# Patient Record
Sex: MALE | Race: BLACK OR AFRICAN AMERICAN
[De-identification: names, ages, dates, MRNs, and addresses within clinical notes are randomized per-mention and may not be internally consistent; named-entity substitution may affect disease eponyms.]

---

## 2022-10-31 ENCOUNTER — HOSPITAL ENCOUNTER (INPATIENT)
Dept: HOSPITAL 54 - ER | Age: 72
LOS: 9 days | Discharge: NURSING, REHAB OR LONG-TERM CARE | DRG: 871 | End: 2022-11-09
Attending: NURSE PRACTITIONER | Admitting: NURSE PRACTITIONER
Payer: MEDICARE

## 2022-10-31 VITALS — DIASTOLIC BLOOD PRESSURE: 49 MMHG | SYSTOLIC BLOOD PRESSURE: 77 MMHG

## 2022-10-31 VITALS — HEIGHT: 65 IN | BODY MASS INDEX: 29.49 KG/M2 | WEIGHT: 177 LBS

## 2022-10-31 VITALS — DIASTOLIC BLOOD PRESSURE: 55 MMHG | SYSTOLIC BLOOD PRESSURE: 101 MMHG

## 2022-10-31 DIAGNOSIS — E83.52: ICD-10-CM

## 2022-10-31 DIAGNOSIS — I10: ICD-10-CM

## 2022-10-31 DIAGNOSIS — Z79.899: ICD-10-CM

## 2022-10-31 DIAGNOSIS — E44.0: ICD-10-CM

## 2022-10-31 DIAGNOSIS — F02.80: ICD-10-CM

## 2022-10-31 DIAGNOSIS — G93.41: ICD-10-CM

## 2022-10-31 DIAGNOSIS — M81.0: ICD-10-CM

## 2022-10-31 DIAGNOSIS — Z96.641: ICD-10-CM

## 2022-10-31 DIAGNOSIS — D68.9: ICD-10-CM

## 2022-10-31 DIAGNOSIS — K73.9: ICD-10-CM

## 2022-10-31 DIAGNOSIS — F20.9: ICD-10-CM

## 2022-10-31 DIAGNOSIS — Z91.81: ICD-10-CM

## 2022-10-31 DIAGNOSIS — D64.9: ICD-10-CM

## 2022-10-31 DIAGNOSIS — F29: ICD-10-CM

## 2022-10-31 DIAGNOSIS — C34.90: ICD-10-CM

## 2022-10-31 DIAGNOSIS — K43.9: ICD-10-CM

## 2022-10-31 DIAGNOSIS — Z20.822: ICD-10-CM

## 2022-10-31 DIAGNOSIS — G20: ICD-10-CM

## 2022-10-31 DIAGNOSIS — E87.20: ICD-10-CM

## 2022-10-31 DIAGNOSIS — E86.1: ICD-10-CM

## 2022-10-31 DIAGNOSIS — J18.0: ICD-10-CM

## 2022-10-31 DIAGNOSIS — I48.91: ICD-10-CM

## 2022-10-31 DIAGNOSIS — A41.9: Primary | ICD-10-CM

## 2022-10-31 LAB
ALBUMIN SERPL BCP-MCNC: 2.1 G/DL (ref 3.4–5)
ALP SERPL-CCNC: 84 U/L (ref 46–116)
ALT SERPL W P-5'-P-CCNC: 15 U/L (ref 12–78)
AST SERPL W P-5'-P-CCNC: 28 U/L (ref 15–37)
BASOPHILS # BLD AUTO: 0 K/UL (ref 0–0.2)
BASOPHILS NFR BLD AUTO: 0.3 % (ref 0–2)
BILIRUB DIRECT SERPL-MCNC: 0.3 MG/DL (ref 0–0.2)
BILIRUB SERPL-MCNC: 0.6 MG/DL (ref 0.2–1)
BUN SERPL-MCNC: 22 MG/DL (ref 7–18)
CALCIUM SERPL-MCNC: 10.5 MG/DL (ref 8.5–10.1)
CHLORIDE SERPL-SCNC: 102 MMOL/L (ref 98–107)
CO2 SERPL-SCNC: 30 MMOL/L (ref 21–32)
CREAT SERPL-MCNC: 1.2 MG/DL (ref 0.6–1.3)
EOSINOPHIL NFR BLD AUTO: 0.2 % (ref 0–6)
GLUCOSE SERPL-MCNC: 164 MG/DL (ref 74–106)
HCT VFR BLD AUTO: 41 % (ref 39–51)
HGB BLD-MCNC: 13.7 G/DL (ref 13.5–17.5)
LYMPHOCYTES NFR BLD AUTO: 0.7 K/UL (ref 0.8–4.8)
LYMPHOCYTES NFR BLD AUTO: 6.7 % (ref 20–44)
MCHC RBC AUTO-ENTMCNC: 33 G/DL (ref 31–36)
MCV RBC AUTO: 88 FL (ref 80–96)
MONOCYTES NFR BLD AUTO: 1.1 K/UL (ref 0.1–1.3)
MONOCYTES NFR BLD AUTO: 10.3 % (ref 2–12)
NEUTROPHILS # BLD AUTO: 8.7 K/UL (ref 1.8–8.9)
NEUTROPHILS NFR BLD AUTO: 82.5 % (ref 43–81)
PLATELET # BLD AUTO: 209 K/UL (ref 150–450)
POTASSIUM SERPL-SCNC: 4.2 MMOL/L (ref 3.5–5.1)
PROT SERPL-MCNC: 9.1 G/DL (ref 6.4–8.2)
RBC # BLD AUTO: 4.68 MIL/UL (ref 4.5–6)
SODIUM SERPL-SCNC: 140 MMOL/L (ref 136–145)
WBC NRBC COR # BLD AUTO: 10.5 K/UL (ref 4.3–11)

## 2022-10-31 PROCEDURE — G0378 HOSPITAL OBSERVATION PER HR: HCPCS

## 2022-10-31 PROCEDURE — C9803 HOPD COVID-19 SPEC COLLECT: HCPCS

## 2022-10-31 PROCEDURE — A4349 DISPOSABLE MALE EXTERNAL CAT: HCPCS

## 2022-10-31 PROCEDURE — A9575 INJ GADOTERATE MEGLUMI 0.1ML: HCPCS

## 2022-10-31 RX ADMIN — CALCIUM CARBONATE-CHOLECALCIFEROL TAB 250 MG-125 UNIT SCH UDTAB: 250-125 TAB at 17:43

## 2022-10-31 RX ADMIN — Medication SCH MG: at 21:12

## 2022-10-31 RX ADMIN — PIPERACILLIN SODIUM AND TAZOBACTAM SODIUM SCH MLS/HR: .375; 3 INJECTION, POWDER, LYOPHILIZED, FOR SOLUTION INTRAVENOUS at 23:19

## 2022-10-31 RX ADMIN — PIPERACILLIN SODIUM AND TAZOBACTAM SODIUM SCH MLS/HR: .375; 3 INJECTION, POWDER, LYOPHILIZED, FOR SOLUTION INTRAVENOUS at 17:55

## 2022-10-31 RX ADMIN — SODIUM CHLORIDE PRN MLS/HR: 9 INJECTION, SOLUTION INTRAVENOUS at 17:56

## 2022-10-31 RX ADMIN — Medication SCH MG: at 17:43

## 2022-10-31 RX ADMIN — BENZTROPINE MESYLATE SCH MG: 1 TABLET ORAL at 17:43

## 2022-10-31 RX ADMIN — LORAZEPAM SCH MG: 1 TABLET ORAL at 21:12

## 2022-10-31 RX ADMIN — DIVALPROEX SODIUM SCH MG: 250 TABLET, DELAYED RELEASE ORAL at 17:43

## 2022-10-31 NOTE — NUR
RN ADMITTING NOTES



PATIENT ARRIVED TO UNIT VIA GURNEY @1700, ACCOMPANIED BY TWO ER STAFF. PATIENT ON 3L OF O2 
VIA NC. NO S/S OF RESPIRATORY DISTRESS. PATIENT HAS THREE IV ACCESS: R UA #18 RUNNING NS 
@125 ML/HR, R FA #20 SL, AND L AC #18 SL. INTACT AND PATENT. PATIENT IS A/Ox1-2, ABLE TO 
MAKE NEEDS KNOWN. PATIENT IS INCONTINENT, USES DIAPER. ORIENTED TO ROOM, CALL LIGHT, AND 
STAFF. TELE MONITORING ATTACHED: SHOWING SR HR 75. FULL BODY ASSESSMENT COMPLETED: CARDIAC 
WNL, RESPIRATORY L LOWER LOBE DIMINISHED, GI/ WNL, SKIN: SACRAL SCARING, ABDOMINAL 
SCARRING, L LEG SCARRING. SAFETY MEASURES IN PLACE: CALL LIGHT WITHIN REACH, HOB ELEVATED, 
SIDE RAILS UPx 2, BED LOCKED AND IN LOWEST POSITION, BED ALARM ON. WILL CONTINUE TO MONITOR.

## 2022-10-31 NOTE — NUR
BIB  FROM CARE FACILITY DUE TO FEVER AND O2SAT IN THE LOW 80'S ON ROOM 
AIR TACHY A/O X2 ABLE TO MAKE NEEDS KNOWN. IV LAC PRESENT ON ARRIVAL

## 2022-11-01 VITALS — DIASTOLIC BLOOD PRESSURE: 82 MMHG | SYSTOLIC BLOOD PRESSURE: 143 MMHG

## 2022-11-01 VITALS — DIASTOLIC BLOOD PRESSURE: 54 MMHG | SYSTOLIC BLOOD PRESSURE: 94 MMHG

## 2022-11-01 VITALS — SYSTOLIC BLOOD PRESSURE: 99 MMHG | DIASTOLIC BLOOD PRESSURE: 73 MMHG

## 2022-11-01 LAB
BASOPHILS # BLD AUTO: 0 K/UL (ref 0–0.2)
BASOPHILS NFR BLD AUTO: 0.3 % (ref 0–2)
BUN SERPL-MCNC: 18 MG/DL (ref 7–18)
CALCIUM SERPL-MCNC: 9 MG/DL (ref 8.5–10.1)
CHLORIDE SERPL-SCNC: 105 MMOL/L (ref 98–107)
CO2 SERPL-SCNC: 27 MMOL/L (ref 21–32)
CREAT SERPL-MCNC: 0.8 MG/DL (ref 0.6–1.3)
D DIMER PPP FEU-MCNC: 8.63 MG/L(FEU (ref 0.17–0.5)
EOSINOPHIL NFR BLD AUTO: 3.1 % (ref 0–6)
FERRITIN SERPL-MCNC: 588 NG/ML (ref 8–388)
GLUCOSE SERPL-MCNC: 100 MG/DL (ref 74–106)
HCT VFR BLD AUTO: 35 % (ref 39–51)
HGB BLD-MCNC: 11.4 G/DL (ref 13.5–17.5)
IRON SERPL-MCNC: 50 UG/DL (ref 50–175)
LYMPHOCYTES NFR BLD AUTO: 1.6 K/UL (ref 0.8–4.8)
LYMPHOCYTES NFR BLD AUTO: 22.3 % (ref 20–44)
MAGNESIUM SERPL-MCNC: 2 MG/DL (ref 1.8–2.4)
MCHC RBC AUTO-ENTMCNC: 32 G/DL (ref 31–36)
MCV RBC AUTO: 88 FL (ref 80–96)
MONOCYTES NFR BLD AUTO: 1.1 K/UL (ref 0.1–1.3)
MONOCYTES NFR BLD AUTO: 15 % (ref 2–12)
NEUTROPHILS # BLD AUTO: 4.3 K/UL (ref 1.8–8.9)
NEUTROPHILS NFR BLD AUTO: 59.3 % (ref 43–81)
PHOSPHATE SERPL-MCNC: 3.5 MG/DL (ref 2.5–4.9)
PLATELET # BLD AUTO: 155 K/UL (ref 150–450)
POTASSIUM SERPL-SCNC: 3.8 MMOL/L (ref 3.5–5.1)
RBC # BLD AUTO: 3.99 MIL/UL (ref 4.5–6)
SODIUM SERPL-SCNC: 140 MMOL/L (ref 136–145)
TIBC SERPL-MCNC: 202 UG/DL (ref 250–450)
TSH SERPL DL<=0.005 MIU/L-ACNC: 2.43 UIU/ML (ref 0.36–3.74)
WBC NRBC COR # BLD AUTO: 7.3 K/UL (ref 4.3–11)

## 2022-11-01 RX ADMIN — BENZTROPINE MESYLATE SCH MG: 1 TABLET ORAL at 08:33

## 2022-11-01 RX ADMIN — PIPERACILLIN SODIUM AND TAZOBACTAM SODIUM SCH MLS/HR: .375; 3 INJECTION, POWDER, LYOPHILIZED, FOR SOLUTION INTRAVENOUS at 12:46

## 2022-11-01 RX ADMIN — Medication SCH MG: at 08:34

## 2022-11-01 RX ADMIN — ACETAMINOPHEN PRN MG: 325 TABLET ORAL at 17:02

## 2022-11-01 RX ADMIN — CALCIUM CARBONATE-CHOLECALCIFEROL TAB 250 MG-125 UNIT SCH UDTAB: 250-125 TAB at 08:34

## 2022-11-01 RX ADMIN — SODIUM CHLORIDE PRN MLS/HR: 9 INJECTION, SOLUTION INTRAVENOUS at 17:10

## 2022-11-01 RX ADMIN — LORAZEPAM SCH MG: 1 TABLET ORAL at 08:33

## 2022-11-01 RX ADMIN — Medication SCH MG: at 17:33

## 2022-11-01 RX ADMIN — PIPERACILLIN SODIUM AND TAZOBACTAM SODIUM SCH MLS/HR: .375; 3 INJECTION, POWDER, LYOPHILIZED, FOR SOLUTION INTRAVENOUS at 05:01

## 2022-11-01 RX ADMIN — SODIUM CHLORIDE PRN MLS/HR: 9 INJECTION, SOLUTION INTRAVENOUS at 03:38

## 2022-11-01 RX ADMIN — LORAZEPAM SCH MG: 1 TABLET ORAL at 21:54

## 2022-11-01 RX ADMIN — SODIUM CHLORIDE PRN MLS/HR: 9 INJECTION, SOLUTION INTRAVENOUS at 12:46

## 2022-11-01 RX ADMIN — DIVALPROEX SODIUM SCH MG: 250 TABLET, DELAYED RELEASE ORAL at 08:33

## 2022-11-01 RX ADMIN — DIVALPROEX SODIUM SCH MG: 250 TABLET, DELAYED RELEASE ORAL at 17:33

## 2022-11-01 RX ADMIN — DEXTROSE MONOHYDRATE SCH MLS/HR: 50 INJECTION, SOLUTION INTRAVENOUS at 14:17

## 2022-11-01 RX ADMIN — Medication SCH MG: at 21:53

## 2022-11-01 RX ADMIN — PIPERACILLIN SODIUM AND TAZOBACTAM SODIUM SCH MLS/HR: .375; 3 INJECTION, POWDER, LYOPHILIZED, FOR SOLUTION INTRAVENOUS at 18:19

## 2022-11-01 RX ADMIN — BENZTROPINE MESYLATE SCH MG: 1 TABLET ORAL at 17:34

## 2022-11-01 RX ADMIN — DEXTROSE MONOHYDRATE SCH MLS/HR: 50 INJECTION, SOLUTION INTRAVENOUS at 01:45

## 2022-11-01 NOTE — NUR
sylvia rn note 

 received patient from  from med/surg  with dx sepsis and a flutter on tele monitor patient 
alert with confusion,unable to answer any questions,  on 3 l nac of o2 saturation  97%, 
placed on tele monitor st \aflutter hr 145 , at this time,  lt ac hl and rt upper arm and rt 
fa hl intact and flushed well , t [id number] at this time i, Tylenol po given coolimg measure 
provided , bed in lowest and locked position. call light within reach with safety measures 
implemented. All needs were attended for the moment., on ivf as ordered , will monitor 



Contacted pharmacy for the antibiotic

## 2022-11-01 NOTE — NUR
RN NOTES



DR MACKENZIE HAS BEEN MADE AWARE THAT THE PT REGISTERED WITH HR OF OVER 130S, MD ORDERED EKG 
STAT- RESULTED AS ATRIAL FLUTTER, ORDERED AMIODARONE BOLUS AND DRIP, [facility] HAS BEEN NOTIFIED 
FOR A TRANSFER. RECOMMENDED CARDIO CONSULT -DR ONTIVEROS MADE AWARE.

## 2022-11-01 NOTE — NUR
RN NOTES



TRANSFERRED PATIENT TO [facility] AND WAS RECEIVED BY KELSIE RODRIGUES. PATIENT WAS SAFELY TRANSFERRED VIA 
HIS OWN BED, ON 3LPM VIA NC, TELEMONITORING REPORTS SR 130S BPM, EKG SHOWS ATRIAL FLUTTER. 
CALLED THE PHARMACY TO SEND AMIODARONE TO THE [facility]. ALL BELONGINGS CHART, AND MEDICATIONS 
ENDORSED. MD AND CHARGE NURSE AWARE OF THE TRANSFER.

## 2022-11-01 NOTE — NUR
TELE RN CLOSING NOTES



PATIENT LYING IN BED, AWAKE AOx2, ABLE TO MAKE NEEDS KNOWN. ON NC 3LPM AND TOLERATING WELL. 
NO SOB NOTED. NO S/SX OF RESPIRATORY DISTRESS NOTED. TELE MONITOR SHOWING SINUS GROVER/SINUS 
RHYTHM HR 55. IV ACCESS IN L AC#18 SL, R FA#20 SL, R UA#18 RUNNING NS @125 ML/HR. IV IS 
INTACT, PATENT, AND FLUSHING WELL. SAFETY PRECAUTIONS IN PLACE: BED IN LOWEST, LOCKED 
POSITION, SIDE RAILS UPx2, AND BRAKES ON. TABLE AND CALL LIGHT WITHIN REACH. WILL ENDORSE TO 
NEXT SHIFT ANY CUCO.

## 2022-11-01 NOTE — NUR
TELE RN OPENING NOTES



RECEIVED PT LYING IN BED, AWAKE AOx1-2, EPISODES OF CONFUSION,  ABLE TO MAKE NEEDS KNOWN. ON 
NC 3LPM AND TOLERATING WELL AT 97%. NO SOB NOTED, BREATHING EVEN AND UNLABORED. NO S/SX OF 
RESPIRATORY DISTRESS NOTED. TELE MONITOR SHOWING SINUS GROVER/SINUS RHYTHM AT 50 BPM. IV 
ACCESSES NOTED IN LAC G#18 (SL), RFA G#20 (SL), JENNY G#18 RUNNING NS @125 ML/HR. ALL IV 
ACCESSES INTACT, PATENT, AND FLUSHING WELL. SAFETY PRECAUTIONS IN PLACE: BED IN LOWEST, 
LOCKED POSITION, SIDE RAILS UPx3, AND BRAKES ON, TRAY TABLE AND CALL LIGHT WITHIN REACH. 
WILL CONTINUE TO MONITOR DURING MY SHIFT.

## 2022-11-01 NOTE — NUR
RN NOTES:



PT IS ON AMIO DRIP. HR CONVERTED TO SINUS RHYTHM TO SINUS GROVER 58 TO 59. NOTIFIED DR. PERES. ORDER TO STOP THE AMIO DRIP. NOTED AND CARRIED OUT.

## 2022-11-01 NOTE — NUR
RN NOTES - LAB RESULTS



LAB CALLED TO REPORT GRAM POSITIVE COCCI IN CHAINS SEEN ON GRAM STAIN AEROBIC - MD MADE 
AWARE.

## 2022-11-01 NOTE — NUR
[facility] RN NOTE 

 VENOUS DOPPLEX DONE NEGATIVE FOR DVT PER TECH , AND LEFT A MESSAGE TO JONE  FAMILY MEMBER TO 
GET CONSENT FOR MRI AND CT SCAN  WILL ENDORSE TO RN  NEXT SHIFT DEVIN

## 2022-11-02 VITALS — SYSTOLIC BLOOD PRESSURE: 119 MMHG | DIASTOLIC BLOOD PRESSURE: 85 MMHG

## 2022-11-02 VITALS — SYSTOLIC BLOOD PRESSURE: 99 MMHG | DIASTOLIC BLOOD PRESSURE: 73 MMHG

## 2022-11-02 VITALS — DIASTOLIC BLOOD PRESSURE: 64 MMHG | SYSTOLIC BLOOD PRESSURE: 110 MMHG

## 2022-11-02 VITALS — SYSTOLIC BLOOD PRESSURE: 110 MMHG | DIASTOLIC BLOOD PRESSURE: 58 MMHG

## 2022-11-02 VITALS — DIASTOLIC BLOOD PRESSURE: 63 MMHG | SYSTOLIC BLOOD PRESSURE: 90 MMHG

## 2022-11-02 VITALS — DIASTOLIC BLOOD PRESSURE: 61 MMHG | SYSTOLIC BLOOD PRESSURE: 97 MMHG

## 2022-11-02 LAB
ALBUMIN SERPL BCP-MCNC: 1.6 G/DL (ref 3.4–5)
ALBUMIN/GLOB SERPL: 0.4 {RATIO} (ref 0.7–1.7)
ALP SERPL-CCNC: 64 U/L (ref 46–116)
ALPHA1 GLOB SERPL ELPH-MCNC: 0.4 G/DL (ref 0–0.4)
ALPHA2 GLOB SERPL ELPH-MCNC: 0.9 G/DL (ref 0.4–1)
ALT SERPL W P-5'-P-CCNC: 8 U/L (ref 12–78)
AST SERPL W P-5'-P-CCNC: 21 U/L (ref 15–37)
B-GLOBULIN SERPL ELPH-MCNC: 1.1 G/DL (ref 0.7–1.3)
BILIRUB SERPL-MCNC: 0.5 MG/DL (ref 0.2–1)
BUN SERPL-MCNC: 12 MG/DL (ref 7–18)
CALCIUM SERPL-MCNC: 8.9 MG/DL (ref 8.5–10.1)
CHLORIDE SERPL-SCNC: 106 MMOL/L (ref 98–107)
CO2 SERPL-SCNC: 30 MMOL/L (ref 21–32)
CREAT SERPL-MCNC: 0.9 MG/DL (ref 0.6–1.3)
GLUCOSE SERPL-MCNC: 104 MG/DL (ref 74–106)
IGA SERPL-MCNC: 350 MG/DL (ref 61–437)
IGM SERPL-MCNC: 233 MG/DL (ref 15–143)
POTASSIUM SERPL-SCNC: 3.8 MMOL/L (ref 3.5–5.1)
PROT SERPL-MCNC: 7 G/DL (ref 6.4–8.2)
SODIUM SERPL-SCNC: 137 MMOL/L (ref 136–145)

## 2022-11-02 RX ADMIN — LORAZEPAM SCH MG: 1 TABLET ORAL at 08:45

## 2022-11-02 RX ADMIN — DIVALPROEX SODIUM SCH MG: 250 TABLET, DELAYED RELEASE ORAL at 08:46

## 2022-11-02 RX ADMIN — BENZTROPINE MESYLATE SCH MG: 1 TABLET ORAL at 17:11

## 2022-11-02 RX ADMIN — Medication SCH MG: at 17:11

## 2022-11-02 RX ADMIN — BENZTROPINE MESYLATE SCH MG: 1 TABLET ORAL at 08:45

## 2022-11-02 RX ADMIN — DIVALPROEX SODIUM SCH MG: 250 TABLET, DELAYED RELEASE ORAL at 17:11

## 2022-11-02 RX ADMIN — SODIUM CHLORIDE PRN MLS/HR: 9 INJECTION, SOLUTION INTRAVENOUS at 03:42

## 2022-11-02 RX ADMIN — PIPERACILLIN SODIUM AND TAZOBACTAM SODIUM SCH MLS/HR: .375; 3 INJECTION, POWDER, LYOPHILIZED, FOR SOLUTION INTRAVENOUS at 00:19

## 2022-11-02 RX ADMIN — PIPERACILLIN SODIUM AND TAZOBACTAM SODIUM SCH MLS/HR: .375; 3 INJECTION, POWDER, LYOPHILIZED, FOR SOLUTION INTRAVENOUS at 17:35

## 2022-11-02 RX ADMIN — DEXTROSE MONOHYDRATE SCH MLS/HR: 50 INJECTION, SOLUTION INTRAVENOUS at 01:06

## 2022-11-02 RX ADMIN — PIPERACILLIN SODIUM AND TAZOBACTAM SODIUM SCH MLS/HR: .375; 3 INJECTION, POWDER, LYOPHILIZED, FOR SOLUTION INTRAVENOUS at 05:10

## 2022-11-02 RX ADMIN — Medication SCH MG: at 08:45

## 2022-11-02 RX ADMIN — PIPERACILLIN SODIUM AND TAZOBACTAM SODIUM SCH MLS/HR: .375; 3 INJECTION, POWDER, LYOPHILIZED, FOR SOLUTION INTRAVENOUS at 11:34

## 2022-11-02 RX ADMIN — Medication SCH MG: at 22:55

## 2022-11-02 RX ADMIN — LORAZEPAM SCH MG: 1 TABLET ORAL at 22:54

## 2022-11-02 RX ADMIN — DEXTROSE MONOHYDRATE SCH MLS/HR: 50 INJECTION, SOLUTION INTRAVENOUS at 15:03

## 2022-11-02 NOTE — NUR
RN CLOSING NOTES:



PT IN BED, SLEEPING BUT EASILY AROUSABLE, ALERT/ORIENTED X2 AND VERBALLY RESPONSIVE WITH 
CONFUSION. ON O2 AT 3L/MIN VIA N/C AND PT TOLERATED WELL. O2 SAT 99%. IV ACCESSES ON LAC#18 
G, RFA#20G, JENNY#18G INTACT AND PATENT. NO S/S OF INFILTRATIONS. RUNNING NS AT 125CC/HR. NO 
C/O PAIN OR DISCOMFORT. NO ACUTE DISTRESS. PT REMAIN NPO AFTER MIDNIGHT FOR MRI AND CT SCAN 
W/ CONTRAST. ALL DUE MEDS GIVEN AS ORDERED. SAFETY MEASURES IN PLACE. SIDE RAILS UP X3, BED 
IN LOWEST POSITION AND LOCKED. PLACE CALL LIGHT WITH IN REACH. WILL ENDORSE TO MORNING SHIFT 
NURSE.

## 2022-11-02 NOTE — NUR
RN NOTES:

 

CALLED DAUGHTER JONE TO OBTAIN  CONSENT FOR MRI AND CT SCAN. DIDN'T ANSWER. LEFT MESSAGE.

## 2022-11-02 NOTE — NUR
RN OPEN NOTES:



PT IN BED, SLEEPING , ALERT/ORIENTED X2 AND VERBALLY RESPONSIVE OCCASIONALLY  CONFUSION. ON 
O2 AT 3L/MIN VIA N/C AND PT TOLERATED WELL. O2 SAT 99%. IV ACCESSES ON LAC#18 G, RFA#20G, 
JENNY#18G INTACT AND PATENT. NO S/S OF INFILTRATIONS. RUNNING NS AT 125CC/HR. NO C/O PAIN OR 
DISCOMFORT. NO ACUTE DISTRESS.  SAFETY MEASURES IN PLACE. SIDE RAILS UP X3, BED IN LOWEST 
POSITION AND LOCKED. PLACE CALL LIGHT WITH IN REACH. WILL CONTINUE TO MONITOR  AND FALLOW 
POC

## 2022-11-02 NOTE — NUR
RN CLOSING NOTES:

PT IN BED,  ALERT/ORIENTED X2 AND VERBALLY RESPONSIVE WITH CONFUSION. ON O2 AT 5L/MIN VIA 
N/C AND PT TOLERATED WELL. O2 SAT 99%. IV ACCESSES ON LAC#18 G, RFA#20G, JENNY#18G INTACT AND 
PATENT. NO S/S OF INFILTRATIONS.  NO C/O PAIN OR DISCOMFORT. NO ACUTE DISTRESS. PT WILL BE 
NPO AFTER MIDNIGHT FOR MRI W/ CONTRAST. ALL DUE MEDS GIVEN AS ORDERED. SAFETY MEASURES IN 
PLACE. SIDE RAILS UP X3, BED IN LOWEST POSITION AND LOCKED. PLACE CALL LIGHT WITH IN REACH. 
WILL ENDORSE TO MORNING SHIFT NURSE.

## 2022-11-03 VITALS — SYSTOLIC BLOOD PRESSURE: 91 MMHG | DIASTOLIC BLOOD PRESSURE: 56 MMHG

## 2022-11-03 VITALS — DIASTOLIC BLOOD PRESSURE: 67 MMHG | SYSTOLIC BLOOD PRESSURE: 95 MMHG

## 2022-11-03 VITALS — DIASTOLIC BLOOD PRESSURE: 63 MMHG | SYSTOLIC BLOOD PRESSURE: 110 MMHG

## 2022-11-03 VITALS — SYSTOLIC BLOOD PRESSURE: 98 MMHG | DIASTOLIC BLOOD PRESSURE: 63 MMHG

## 2022-11-03 VITALS — SYSTOLIC BLOOD PRESSURE: 98 MMHG | DIASTOLIC BLOOD PRESSURE: 62 MMHG

## 2022-11-03 VITALS — DIASTOLIC BLOOD PRESSURE: 84 MMHG | SYSTOLIC BLOOD PRESSURE: 118 MMHG

## 2022-11-03 LAB
BUN SERPL-MCNC: 8 MG/DL (ref 7–18)
CALCIUM SERPL-MCNC: 9.1 MG/DL (ref 8.5–10.1)
CHLORIDE SERPL-SCNC: 104 MMOL/L (ref 98–107)
CO2 SERPL-SCNC: 29 MMOL/L (ref 21–32)
CREAT SERPL-MCNC: 0.9 MG/DL (ref 0.6–1.3)
GLUCOSE SERPL-MCNC: 128 MG/DL (ref 74–106)
POTASSIUM SERPL-SCNC: 3.6 MMOL/L (ref 3.5–5.1)
SODIUM SERPL-SCNC: 139 MMOL/L (ref 136–145)

## 2022-11-03 RX ADMIN — PIPERACILLIN SODIUM AND TAZOBACTAM SODIUM SCH MLS/HR: .375; 3 INJECTION, POWDER, LYOPHILIZED, FOR SOLUTION INTRAVENOUS at 12:13

## 2022-11-03 RX ADMIN — ACETAMINOPHEN PRN MG: 325 TABLET ORAL at 01:44

## 2022-11-03 RX ADMIN — DIVALPROEX SODIUM SCH MG: 250 TABLET, DELAYED RELEASE ORAL at 08:26

## 2022-11-03 RX ADMIN — Medication SCH MG: at 08:26

## 2022-11-03 RX ADMIN — PIPERACILLIN SODIUM AND TAZOBACTAM SODIUM SCH MLS/HR: .375; 3 INJECTION, POWDER, LYOPHILIZED, FOR SOLUTION INTRAVENOUS at 06:12

## 2022-11-03 RX ADMIN — BENZTROPINE MESYLATE SCH MG: 1 TABLET ORAL at 08:26

## 2022-11-03 RX ADMIN — DEXTROSE MONOHYDRATE SCH MLS/HR: 50 INJECTION, SOLUTION INTRAVENOUS at 04:31

## 2022-11-03 RX ADMIN — DEXTROSE MONOHYDRATE SCH MLS/HR: 50 INJECTION, SOLUTION INTRAVENOUS at 21:17

## 2022-11-03 RX ADMIN — PIPERACILLIN SODIUM AND TAZOBACTAM SODIUM SCH MLS/HR: .375; 3 INJECTION, POWDER, LYOPHILIZED, FOR SOLUTION INTRAVENOUS at 18:41

## 2022-11-03 RX ADMIN — Medication SCH MG: at 21:16

## 2022-11-03 RX ADMIN — LORAZEPAM SCH MG: 1 TABLET ORAL at 08:26

## 2022-11-03 RX ADMIN — PIPERACILLIN SODIUM AND TAZOBACTAM SODIUM SCH MLS/HR: .375; 3 INJECTION, POWDER, LYOPHILIZED, FOR SOLUTION INTRAVENOUS at 01:45

## 2022-11-03 RX ADMIN — LORAZEPAM SCH MG: 1 TABLET ORAL at 21:16

## 2022-11-03 RX ADMIN — Medication SCH MG: at 16:14

## 2022-11-03 RX ADMIN — DIVALPROEX SODIUM SCH MG: 125 CAPSULE ORAL at 16:13

## 2022-11-03 RX ADMIN — BENZTROPINE MESYLATE SCH MG: 1 TABLET ORAL at 16:13

## 2022-11-03 NOTE — NUR
TELE RN CLOSING NOTE

PT REMAINS IN BED, AWAKE, A&O X1, CALM, COOPERATIVE. PT ON 4L NC WITH O2SAT RANGING FROM 
94%-100%; NO S/S OF RESP DISTRESS, NO SOB OR COUGH, NON-LABORED AND EQUAL BREATHING. PT 
ATTACHED TO EXTERNAL MONITOR SB-SR WITH HR RANGING FROM 57-76. IV ACCESS ,ON LAC 18G, RFA 
20G, AND JENNY 18G; INTACT AND PATENT; NS TKO A 10 ML/HR. ALL DUE MEDS ADMINISTERED DURING THE 
NIGHT. BED IN LOWEST POSITION, CALL LIGHT WITHIN REACH, SIDE RAILS UP X3. WILL ENDORSE TO 
DAYSHIFT NURSE TO CONTINUE CARE.

## 2022-11-03 NOTE — NUR
RN NOTE



SPOKE WITH MRI STAFF, PT DOES NOT NEED TO BE NPO FOR PROCEDURE. PMD MADE AWARE, T.O RESUME 
DIET.

## 2022-11-03 NOTE — NUR
RN NOTE



PT RECEIVED ASLEEP IN BED, RESPONSIVE TO STIMULI. RECEIVING O2 @ 4L VIA NASAL CANULA. PT IN 
NPO EXCEPT MEDS POST MIDNIGHT FOR CT BIOPSY NEEDLE IN AM. WITH IV ACCESS ON RAC G20 IN PLACE 
AND PATENT,DUE MEDICATIONS GIVEN. AM/PM CARE RENDERED. SAFETY MEASURES FOLLOWED. WILL 
CONTINUE TO MONITOR.

## 2022-11-03 NOTE — NUR
RN NOTE



PT RECEIVED ASLEEP IN BED, RESPONSIVE TO STIMULI. RECEIVING O2 @ 4L VIA NASAL CANULA. PT IN 
NPO EXCEPT MEDS. WITH IV ACCESS ON LAC G18, RFA G20, JENNY G18. SAFETY MEASURES FOLLOWED. WILL 
CONTINUE TO MONITOR.

## 2022-11-03 NOTE — NUR
Responsible Party: GRISELDA called [facility] [phone] where pt. resides and spoke to 
Darlene IGLESIAS regarding responsible party for pt., per Darlene the only point of contact/NEXT OF KIN 
they have is the pt.'s sister, Darlene Lofton [phone]. Per Darlene the pt. has always been 
alert & Oriented and made his own medical decisions. Per Darlene, the pt. has no children or 
spouse and they notified [facility] that pt. was transferred to [facility].  GRISELDA called Darlene x 2 separate 
times and left a voicemail. GRISELDA will continue efforts to contact [facility] and clarify 
relationship. GRIESLDA notified the pt.'s RNCullen.

## 2022-11-03 NOTE — NUR
PT SITTING IN BED, AWAKE EATING DINNER, ALERT/ORIENTED X2 AND VERBALLY RESPONSIVE 
OCCASIONALLY  CONFUSED. ON O2 AT 4L/MIN VIA N/C AND PT TOLERATING WELL. IV ACCESS ON RAC 
G#20. NO C/O PAIN OR DISCOMFORT. NOT IN ACUTE DISTRESS. SAFETY MEASURES IN PLACE. SIDE RAILS 
UP X3, BED LOCKED AND IN LOWEST POSITION. PLACED CALL LIGHT WITH IN REACH. BED ALARM ON, 
WILL CONTINUE TO MONITOR  AND FOLLOW PLAN OF CARE.

## 2022-11-04 VITALS — SYSTOLIC BLOOD PRESSURE: 95 MMHG | DIASTOLIC BLOOD PRESSURE: 56 MMHG

## 2022-11-04 VITALS — SYSTOLIC BLOOD PRESSURE: 124 MMHG | DIASTOLIC BLOOD PRESSURE: 57 MMHG

## 2022-11-04 VITALS — DIASTOLIC BLOOD PRESSURE: 62 MMHG | SYSTOLIC BLOOD PRESSURE: 90 MMHG

## 2022-11-04 VITALS — DIASTOLIC BLOOD PRESSURE: 60 MMHG | SYSTOLIC BLOOD PRESSURE: 94 MMHG

## 2022-11-04 VITALS — SYSTOLIC BLOOD PRESSURE: 92 MMHG | DIASTOLIC BLOOD PRESSURE: 67 MMHG

## 2022-11-04 VITALS — SYSTOLIC BLOOD PRESSURE: 99 MMHG | DIASTOLIC BLOOD PRESSURE: 74 MMHG

## 2022-11-04 LAB
BUN SERPL-MCNC: 9 MG/DL (ref 7–18)
CALCIUM SERPL-MCNC: 9.1 MG/DL (ref 8.5–10.1)
CHLORIDE SERPL-SCNC: 103 MMOL/L (ref 98–107)
CO2 SERPL-SCNC: 31 MMOL/L (ref 21–32)
CREAT SERPL-MCNC: 0.8 MG/DL (ref 0.6–1.3)
GLUCOSE SERPL-MCNC: 111 MG/DL (ref 74–106)
POTASSIUM SERPL-SCNC: 3.8 MMOL/L (ref 3.5–5.1)
SODIUM SERPL-SCNC: 137 MMOL/L (ref 136–145)

## 2022-11-04 RX ADMIN — DIVALPROEX SODIUM SCH MG: 125 CAPSULE ORAL at 10:44

## 2022-11-04 RX ADMIN — LORAZEPAM SCH MG: 1 TABLET ORAL at 21:32

## 2022-11-04 RX ADMIN — Medication SCH MG: at 17:36

## 2022-11-04 RX ADMIN — Medication SCH MG: at 10:45

## 2022-11-04 RX ADMIN — DEXTROSE MONOHYDRATE SCH MLS/HR: 50 INJECTION, SOLUTION INTRAVENOUS at 21:32

## 2022-11-04 RX ADMIN — Medication SCH MG: at 21:32

## 2022-11-04 RX ADMIN — DIVALPROEX SODIUM SCH MG: 125 CAPSULE ORAL at 17:36

## 2022-11-04 RX ADMIN — BENZTROPINE MESYLATE SCH MG: 1 TABLET ORAL at 17:36

## 2022-11-04 RX ADMIN — LORAZEPAM SCH MG: 1 TABLET ORAL at 10:45

## 2022-11-04 RX ADMIN — BENZTROPINE MESYLATE SCH MG: 1 TABLET ORAL at 10:44

## 2022-11-04 NOTE — NUR
PT ASLEEP IN BED, ALERT/ORIENTED X2 AND VERBALLY RESPONSIVE OCCASIONALLY  CONFUSED. ON O2 AT 
4L/MIN VIA N/C AND PT TOLERATING WELL. IV ACCESS ON RAC G#20. NO C/O PAIN OR DISCOMFORT. NOT 
IN ACUTE DISTRESS. SAFETY MEASURES IN PLACE. SIDE RAILS UP X3, BED LOCKED AND IN LOWEST 
POSITION. PLACED CALL LIGHT WITH IN REACH. BED ALARM ON, WILL ENDORSE TO NEXT NURSE ON DUTY 
FOR CONTINUITY OF CARE.

## 2022-11-04 NOTE — NUR
RN NOTES

 RECEIVED PATIENT IN THE BED A/O X2, TOTAL CARE, REFUSED PAIN. PATIENT ON O2-4LNC NO ACUTE 
RESPIRATORY DISTRESS. PATIENT NP AND SCHEDULED CT GUIDED LUNG BIOPSY TIODAY, ACLLED SISTER, 
AND UNABLE TO REACH, LEFT MASSAGE.

## 2022-11-04 NOTE — NUR
RN NOTES

 PM CARE DONE, PATIENT AWAKE, NO ACUTE RESPIRATORY DISTRESS, DUE MEDICATION ADMINISTERED, 
ASSIST TURN AND REPOSTION Q 2 HR. ENDORSED ONCOMING NURSE ABOUT CT GUIDED RASHEED BIOPSY TO 
FOLLOW UP, AND  GET CONSENT FORM SIGN VIA PATIENTS SISTER.

## 2022-11-04 NOTE — NUR
RN NOTES



RECEIVED PT FOR CONTINUITY OF CARE. PATIENT A/OX1-2 IN NO S/SX OF ACUTE DISTRESS AT THIS 
TIME; CURRENTLY ON 4L OF O2 VIA NC; WITH 02 SAT >95% AT THIS TIME.WITH IV ACCESS ON R AC#20. 
PATENT, INTACT AND FLUSHING WELL. . ON PUREED DIET. WILL ENSURE SAFETY MEASURES WITHIN THE 
SHIFT. PATIENT BED ALARM IS ON. HEAD OF BED ELEVATED. BED IS LOCKED,  IN LOWEST POSITION AND 
SIDE RAILS UP. CALL LIGHT WITHIN REACH OF THE PATIENT. WILL CONTINUE TO MONITOR AND REASSESS 
FOR ANY CHANGES AND WILL CARRY OUT ANY ONGOING AND ACTIVE MD ORDER.

## 2022-11-04 NOTE — NUR
PATHOLOGY WAS NOT AVAILABLE, BIOPSIES NEED TO BE DONE BEFORE 12P / PATHOLOGY. SO SPECIMEN 
DOESNT SIT OVER WEEKEND

## 2022-11-04 NOTE — NUR
RN NOTES'

 GET CALL FROM [facility], AND NOTIFIED TODAY UNABLE TO BE DONE  CT GUIDED 
BIOPSY,  BECAUSE NO PATHOLOGY ON  FRIDAY, WILL BE RESCHEDULED DURING A WEEK.  NOTIFIED Dr RICCI.

## 2022-11-05 VITALS — DIASTOLIC BLOOD PRESSURE: 55 MMHG | SYSTOLIC BLOOD PRESSURE: 91 MMHG

## 2022-11-05 VITALS — SYSTOLIC BLOOD PRESSURE: 99 MMHG | DIASTOLIC BLOOD PRESSURE: 76 MMHG

## 2022-11-05 VITALS — DIASTOLIC BLOOD PRESSURE: 56 MMHG | SYSTOLIC BLOOD PRESSURE: 112 MMHG

## 2022-11-05 VITALS — DIASTOLIC BLOOD PRESSURE: 45 MMHG | SYSTOLIC BLOOD PRESSURE: 88 MMHG

## 2022-11-05 VITALS — SYSTOLIC BLOOD PRESSURE: 103 MMHG | DIASTOLIC BLOOD PRESSURE: 66 MMHG

## 2022-11-05 VITALS — SYSTOLIC BLOOD PRESSURE: 94 MMHG | DIASTOLIC BLOOD PRESSURE: 59 MMHG

## 2022-11-05 LAB
BASOPHILS # BLD AUTO: 0 K/UL (ref 0–0.2)
BASOPHILS NFR BLD AUTO: 0.4 % (ref 0–2)
BUN SERPL-MCNC: 8 MG/DL (ref 7–18)
CALCIUM SERPL-MCNC: 9.9 MG/DL (ref 8.5–10.1)
CHLORIDE SERPL-SCNC: 103 MMOL/L (ref 98–107)
CO2 SERPL-SCNC: 31 MMOL/L (ref 21–32)
CREAT SERPL-MCNC: 0.9 MG/DL (ref 0.6–1.3)
EOSINOPHIL NFR BLD AUTO: 1.9 % (ref 0–6)
EOSINOPHIL NFR BLD MANUAL: 3 % (ref 0–4)
GLUCOSE SERPL-MCNC: 169 MG/DL (ref 74–106)
HCT VFR BLD AUTO: 35 % (ref 39–51)
HGB BLD-MCNC: 11.3 G/DL (ref 13.5–17.5)
LYMPHOCYTES NFR BLD AUTO: 2.5 K/UL (ref 0.8–4.8)
LYMPHOCYTES NFR BLD AUTO: 23.7 % (ref 20–44)
LYMPHOCYTES NFR BLD MANUAL: 30 % (ref 16–48)
MCHC RBC AUTO-ENTMCNC: 33 G/DL (ref 31–36)
MCV RBC AUTO: 88 FL (ref 80–96)
MONOCYTES NFR BLD AUTO: 1.8 K/UL (ref 0.1–1.3)
MONOCYTES NFR BLD AUTO: 17 % (ref 2–12)
MONOCYTES NFR BLD MANUAL: 9 % (ref 0–11)
NEUTROPHILS # BLD AUTO: 6.1 K/UL (ref 1.8–8.9)
NEUTROPHILS NFR BLD AUTO: 57 % (ref 43–81)
NEUTS BAND NFR BLD MANUAL: 3 % (ref 0–5)
NEUTS SEG NFR BLD MANUAL: 55 % (ref 42–76)
PLATELET # BLD AUTO: 179 K/UL (ref 150–450)
POTASSIUM SERPL-SCNC: 3.6 MMOL/L (ref 3.5–5.1)
RBC # BLD AUTO: 3.94 MIL/UL (ref 4.5–6)
SODIUM SERPL-SCNC: 136 MMOL/L (ref 136–145)
WBC NRBC COR # BLD AUTO: 10.6 K/UL (ref 4.3–11)

## 2022-11-05 RX ADMIN — LORAZEPAM SCH MG: 1 TABLET ORAL at 21:10

## 2022-11-05 RX ADMIN — BENZTROPINE MESYLATE SCH MG: 1 TABLET ORAL at 09:30

## 2022-11-05 RX ADMIN — Medication SCH MG: at 17:18

## 2022-11-05 RX ADMIN — Medication SCH MG: at 21:11

## 2022-11-05 RX ADMIN — BENZTROPINE MESYLATE SCH MG: 1 TABLET ORAL at 17:18

## 2022-11-05 RX ADMIN — Medication SCH MG: at 09:30

## 2022-11-05 RX ADMIN — LORAZEPAM SCH MG: 1 TABLET ORAL at 09:31

## 2022-11-05 RX ADMIN — DIVALPROEX SODIUM SCH MG: 125 CAPSULE ORAL at 09:31

## 2022-11-05 RX ADMIN — DIVALPROEX SODIUM SCH MG: 125 CAPSULE ORAL at 17:18

## 2022-11-05 RX ADMIN — DEXTROSE MONOHYDRATE SCH MLS/HR: 50 INJECTION, SOLUTION INTRAVENOUS at 21:10

## 2022-11-05 NOTE — NUR
RN NOTES



DR. LEBRON AWARE, PATIENT FOR CT GUIDED LUNG BIOPSY BUT CONSENTS ARE NOT SIGNED AND PER 
DAUGHTER JONE, REFUSE THE PROCEDURE. DR. LEBRON WILL CALL HER TODAY

## 2022-11-05 NOTE — NUR
TELE RN CLOSING NOTE



PT ASLEEP IN BED, ALERT/ORIENTED X1-2 AND VERBALLY RESPONSIVE OCCASIONALLY  CONFUSED. ON O2 
AT 4L/MIN VIA N/C AND PT TOLERATING WELL. IV ACCESS ON RAC G#20. NO C/O PAIN OR DISCOMFORT. 
NOT IN ACUTE DISTRESS. CONDOM CATH IN PLACE. 1100 ML OUTPUT. SAFETY MEASURES IN PLACE. SIDE 
RAILS UP X3, BED LOCKED AND IN LOWEST POSITION. PLACED CALL LIGHT WITH IN REACH. BED ALARM 
ON, ALL NEEDS MET AT HIS TIME. PM CARE AND PRESCRIBED WOUND TREATMENT DONE EARLIER. BED LOW 
LOCKED WILL ENDORSE TO NEXT NURSE ON DUTY FOR CONTINUITY OF CARE.

## 2022-11-05 NOTE — NUR
RN NOTES



RECEIVED PT FOR CONTINUITY OF CARE. PATIENT A/OX1-2, CONFUSED IN NO S/SX OF ACUTE DISTRESS 
AT THIS TIME; CURRENTLY ON 2L OF O2 VIA NC; WITH 02 SAT >95% AT THIS TIME.WITH IV ACCESS ON 
R AC#20. PATENT, INTACT AND FLUSHING WELL. . ON PUREED DIET. WILL ENSURE SAFETY MEASURES 
WITHIN THE SHIFT. PATIENT BED ALARM IS ON. HEAD OF BED ELEVATED. BED IS LOCKED,  IN LOWEST 
POSITION AND SIDE RAILS UP. CALL LIGHT WITHIN REACH OF THE PATIENT. WILL CONTINUE TO MONITOR 
AND REASSESS FOR ANY CHANGES AND WILL CARRY OUT ANY ONGOING AND ACTIVE MD ORDER.

## 2022-11-05 NOTE — NUR
RN OPENING NOTE



RCVD PT ASLEEP IN BED, ALERT/ORIENTED X1-2 AND VERBALLY RESPONSIVE OCCASIONALLY  CONFUSED. 
ON O2 AT 4L/MIN VIA N/C AND PT TOLERATING WELL. IV ACCESS ON RAC G#20. NO C/O PAIN OR 
DISCOMFORT. NOT IN ACUTE DISTRESS. SAFETY MEASURES IN PLACE. SIDE RAILS UP X3, BED LOCKED 
AND IN LOWEST POSITION. PLACED CALL LIGHT WITH IN REACH. BED ALARM ON, WILL ENDORSE TO NEXT 
NURSE ON DUTY FOR CONTINUITY OF CARE.

## 2022-11-05 NOTE — NUR
RN NOTES



CALLED PT'S SISTER (JONE ESCALONA @[phone]) TO SECURE CONSENT FOR CT guided needle biopsy, 
ROUTED TO , REQUESTED FOR CALLBACK. CHARGE RN MADE AWARE.

## 2022-11-05 NOTE — NUR
RN CLOSING NOTE: 



PATIENT REMAINS IN ROOM IN NO SIGNS OF RESPIRATORY DISTRESS, PATIENT NOW ON 2L OF O2 VIA NC 
;TOLERATING WELL SATURATING @ >95% SP02. SR ON MONITOR. SAFETY MEASURES IMPLEMENTED, BED IN 
LOWEST POSITION, LOCKED, SIDE RAILS UP, CALL LIGHT WITHIN REACH. ALL NEEDS AND ORDERS 
ADDRESSED DURING THE SHIFT. IV ACCESS MAINTAINED INTACT, SECURED AND FLUSHING WELL. ALL DUE 
MEDS GIVEN AS ORDERED & SCHEDULED ; PATIENT TOLERATED WELL. PATIENT KEPT CLEAN AND 
COMFORTABLE WITHIN THE SHIFT. STILL NEED TO OBTAINED CONSENT FOR CT GUIDED BIOPSY; WILL 
ENDORSE FOR FOLLOW THROUGH. PATIENT ENDORSED TO INCOMING SHIFT RN WITH STABLE VITAL SIGN AND 
FOR CONTINUITY OF CARE.

-------------------------------------------------------------------------------

Addendum: 11/05/22 at 0720 by JEB MAIER RN

-------------------------------------------------------------------------------

PT A/OX1-2; CONFUSED.

## 2022-11-05 NOTE — NUR
RN NOTES



PATIENT REMAINED TO BE IN NO SIGNS OF ACUTE RESPIRATORY DISTRESS ,SAFE ENVIRONMENT 
MAINTAINED FOR PT. AM PATIENT CARE ASSISTANCE RENDERED. WILL CONTINUE TO MONITOR AND 
REASSESS FOR ANY CHANGES THROUGHOUT THE SHIFT.

## 2022-11-06 VITALS — SYSTOLIC BLOOD PRESSURE: 99 MMHG | DIASTOLIC BLOOD PRESSURE: 57 MMHG

## 2022-11-06 VITALS — DIASTOLIC BLOOD PRESSURE: 67 MMHG | SYSTOLIC BLOOD PRESSURE: 102 MMHG

## 2022-11-06 VITALS — DIASTOLIC BLOOD PRESSURE: 63 MMHG | SYSTOLIC BLOOD PRESSURE: 126 MMHG

## 2022-11-06 VITALS — DIASTOLIC BLOOD PRESSURE: 58 MMHG | SYSTOLIC BLOOD PRESSURE: 101 MMHG

## 2022-11-06 VITALS — DIASTOLIC BLOOD PRESSURE: 78 MMHG | SYSTOLIC BLOOD PRESSURE: 112 MMHG

## 2022-11-06 RX ADMIN — BENZTROPINE MESYLATE SCH MG: 1 TABLET ORAL at 16:17

## 2022-11-06 RX ADMIN — LORAZEPAM SCH MG: 1 TABLET ORAL at 08:08

## 2022-11-06 RX ADMIN — LORAZEPAM SCH MG: 1 TABLET ORAL at 21:21

## 2022-11-06 RX ADMIN — DEXTROSE MONOHYDRATE SCH MLS/HR: 50 INJECTION, SOLUTION INTRAVENOUS at 21:24

## 2022-11-06 RX ADMIN — Medication SCH MG: at 21:22

## 2022-11-06 RX ADMIN — BENZTROPINE MESYLATE SCH MG: 1 TABLET ORAL at 08:06

## 2022-11-06 RX ADMIN — Medication SCH MG: at 08:05

## 2022-11-06 RX ADMIN — DIVALPROEX SODIUM SCH MG: 125 CAPSULE ORAL at 16:17

## 2022-11-06 RX ADMIN — DIVALPROEX SODIUM SCH MG: 125 CAPSULE ORAL at 08:06

## 2022-11-06 RX ADMIN — Medication SCH MG: at 16:17

## 2022-11-06 NOTE — NUR
RN NOTES



CALLED PT'S SISTER (JONE ESCALONA @[phone]) TO SECURE CONSENT FOR CT GUIDED NEEDLE BIOPSY 
ROUTED TO , REQUESTED FOR CALLBACK. CHARGE RN MADE AWARE.

## 2022-11-06 NOTE — NUR
TELE RN CLOSING NOTE



PT ASLEEP IN BED, ALERT/ORIENTED X1-2 AND VERBALLY RESPONSIVE OCCASIONALLY  CONFUSED. ON O2 
AT 2L/MIN VIA N/C AND PT TOLERATING WELL. IV ACCESS ON RAC G#20. NO C/O PAIN OR DISCOMFORT. 
NOT IN ACUTE DISTRESS. CONDOM CATH IN PLACE. 500 ML OUTPUT. SAFETY MEASURES IN PLACE. SIDE 
RAILS UP X3, BED LOCKED AND IN LOWEST POSITION. PLACED CALL LIGHT WITH IN REACH. BED ALARM 
ON, ALL NEEDS MET AT HIS TIME. PM CARE AND PRESCRIBED WOUND TREATMENT DONE EARLIER. BED LOW 
LOCKED WILL ENDORSE TO NEXT NURSE ON DUTY FOR CONTINUITY OF CARE.

## 2022-11-06 NOTE — NUR
RN CLOSING NOTE: 



PATIENT REMAINS IN ROOM IN NO SIGNS OF RESPIRATORY DISTRESS, PATIENT A/OX1-2; CONFUSED. ON 
2L OF O2 VIA NC ;TOLERATING WELL SATURATING @ >95% SP02. SR ON MONITOR. SAFETY MEASURES 
IMPLEMENTED, BED IN LOWEST POSITION, LOCKED, SIDE RAILS UP, CALL LIGHT WITHIN REACH. ALL 
NEEDS AND ORDERS ADDRESSED DURING THE SHIFT. IV ACCESS MAINTAINED INTACT, SECURED AND 
FLUSHING WELL. ALL DUE MEDS GIVEN AS ORDERED & SCHEDULED ; PATIENT TOLERATED WELL. PATIENT 
KEPT CLEAN AND COMFORTABLE WITHIN THE SHIFT. STILL NEED TO OBTAINED CONSENT FOR CT GUIDED 
BIOPSY; WILL ENDORSE FOR FOLLOW THROUGH. PATIENT ENDORSED TO INCOMING SHIFT RN WITH STABLE 
VITAL SIGN AND FOR CONTINUITY OF CARE.

## 2022-11-06 NOTE — NUR
MS RN OPENING NOTE

PT RECEIVED IN BED, AWAKE, A&O X2; CONFUSED. PT ON 2L NC WITH CURRENT O2SAT %; NO S/S 
OF RESP DISTRESS, NO SOB OR COUGH, NON-LABORED AND EQUAL BREATHING. VSS, WILL CONTINUE TO 
MONITOR AS NEEDED. CONDOM CATH INTACT AND PATENT, DRAINING URINE THAT IS ORANGE-YELLOW IN 
COLOR. IV ACCESS ON RAC 20G, INTACT AND PATENT, FLUSHES EASILY WITH NO RESISTANCE; NO 
MEDS/FLUIDS INFUSING THROUGH IT. BED IN LOWEST POSITION, CALL LIGHT WITHIN REACH, SIDE RAILS 
UP X3. WILL CONTINUE TO MONITOR THROUGHOUT THE NIGHT.

## 2022-11-07 VITALS — SYSTOLIC BLOOD PRESSURE: 107 MMHG | DIASTOLIC BLOOD PRESSURE: 65 MMHG

## 2022-11-07 VITALS — SYSTOLIC BLOOD PRESSURE: 93 MMHG | DIASTOLIC BLOOD PRESSURE: 56 MMHG

## 2022-11-07 VITALS — DIASTOLIC BLOOD PRESSURE: 93 MMHG | SYSTOLIC BLOOD PRESSURE: 132 MMHG

## 2022-11-07 RX ADMIN — ACETAMINOPHEN PRN MG: 325 TABLET ORAL at 05:40

## 2022-11-07 RX ADMIN — DEXTROSE MONOHYDRATE SCH MLS/HR: 50 INJECTION, SOLUTION INTRAVENOUS at 21:19

## 2022-11-07 RX ADMIN — DIVALPROEX SODIUM SCH MG: 125 CAPSULE ORAL at 18:20

## 2022-11-07 RX ADMIN — Medication SCH MG: at 18:20

## 2022-11-07 RX ADMIN — DIVALPROEX SODIUM SCH MG: 125 CAPSULE ORAL at 09:49

## 2022-11-07 RX ADMIN — Medication SCH MG: at 21:19

## 2022-11-07 RX ADMIN — BENZTROPINE MESYLATE SCH MG: 1 TABLET ORAL at 09:49

## 2022-11-07 RX ADMIN — LORAZEPAM SCH MG: 1 TABLET ORAL at 09:48

## 2022-11-07 RX ADMIN — LORAZEPAM SCH MG: 1 TABLET ORAL at 21:19

## 2022-11-07 RX ADMIN — BENZTROPINE MESYLATE SCH MG: 1 TABLET ORAL at 18:20

## 2022-11-07 RX ADMIN — Medication SCH MG: at 09:49

## 2022-11-07 NOTE — NUR
TELE RN CLOSING NOTE



PT ASLEEP IN BED, ALERT/ORIENTED X1-2 AND VERBALLY RESPONSIVE OCCASIONALLY  CONFUSED. ON O2 
AT 2L/MIN VIA N/C AND PT TOLERATING WELL. IV ACCESS ON RAC G#20. NO C/O PAIN OR DISCOMFORT. 
NOT IN ACUTE DISTRESS. CONDOM CATH IN PLACE. 500 ML OUTPUT. SAFETY MEASURES IN PLACE. SIDE 
RAILS UP X3, BED LOCKED AND IN LOWEST POSITION. PLACED CALL LIGHT WITH IN REACH. BED ALARM 
ON, ALL NEEDS MET AT HIS TIME.. BED LOW LOCKED. ENDORSE TO NEXT NURSE ON DUTY FOR CONTINUITY 
OF CARE.

## 2022-11-07 NOTE — NUR
RN OPENING NOTES;



RECEIVED PT IN BED AWAKED, ALERT/ORIENTED X1-2 AND VERBALLY RESPONSIVE OCCASIONALLY  
CONFUSED. ON O2 AT 2L/MIN VIA N/C TOLERATING WELL SATING 99%.NO SIGN SOB/DISTRESS NOTED,NO 
SIGN FOR PAIN/DISCOMFORT AT THIS TIME,IV ACCESS ON RAC G#20.SAFETY MEASURES IN PLACE. SIDE 
RAILS UP X3, BED LOCKED AND IN LOWEST POSITION. PLACED CALL LIGHT WITH IN REACH. BED ALARM 
ON, WILL CONTINUE TO MONITOR.

## 2022-11-07 NOTE — NUR
MS RN CLOSING NOTE

PT REMAINS IN BED, AWAKE, A&O X2; CONFUSED. CONTINUES TO BE ON 2L NC WITH O2SAT RANGING FROM 
99%- 100%; NO S/S OF RESP DISTRESS, NO SOB OR COUGH, NON-LABORED AND EQUAL BREATHING. NOTED 
TO HAVE TEMP OF 99.9 AT 0400; OTHERWISE ALL OTHER VSS. CONDOM CATH INTACT AND PATENT, 
DRAINING URINE THAT IS ORANGE-YELLOW IN COLOR. IV ACCESS ON RAC 20G, INTACT AND PATENT, 
FLUSHES EASILY WITH NO RESISTANCE; NO MEDS/FLUIDS INFUSING THROUGH IT. ALL DUE MEDS 
ADMINISTERED DURING THE NIGHT. BED IN LOWEST POSITION, CALL LIGHT WITHIN REACH, SIDE RAILS 
UP X3. WILL ENDORSE TO DAYSHIFT NURSE TO CONTINUE CARE.

## 2022-11-07 NOTE — NUR
MS RN OPENING NOTE

PT RECEIVED IN BED, ASLEEP BUT AROUSABLE, A&O X2; CONFUSED. PT ON 2L NC WITH CURRENT O2SAT 
%; NO S/S OF RESP DISTRESS, NO SOB OR COUGH, NON-LABORED AND EQUAL BREATHING. . CONDOM 
CATH INTACT AND PATENT, DRAINING URINE THAT IS ORANGE-YELLOW IN COLOR. IV ACCESS ON RAC 20G, 
INTACT AND PATENT, FLUSHES EASILY WITH NO RESISTANCE; NO MEDS/FLUIDS INFUSING THROUGH IT. 
BED IN LOWEST POSITION, CALL LIGHT WITHIN REACH, SIDE RAILS UP X3. WILL MONITOR

## 2022-11-08 VITALS — DIASTOLIC BLOOD PRESSURE: 59 MMHG | SYSTOLIC BLOOD PRESSURE: 82 MMHG

## 2022-11-08 VITALS — SYSTOLIC BLOOD PRESSURE: 109 MMHG | DIASTOLIC BLOOD PRESSURE: 72 MMHG

## 2022-11-08 VITALS — DIASTOLIC BLOOD PRESSURE: 65 MMHG | SYSTOLIC BLOOD PRESSURE: 105 MMHG

## 2022-11-08 VITALS — DIASTOLIC BLOOD PRESSURE: 59 MMHG | SYSTOLIC BLOOD PRESSURE: 115 MMHG

## 2022-11-08 VITALS — SYSTOLIC BLOOD PRESSURE: 82 MMHG | DIASTOLIC BLOOD PRESSURE: 59 MMHG

## 2022-11-08 LAB
BASOPHILS # BLD AUTO: 0.1 K/UL (ref 0–0.2)
BASOPHILS NFR BLD AUTO: 0.5 % (ref 0–2)
BUN SERPL-MCNC: 9 MG/DL (ref 7–18)
CALCIUM SERPL-MCNC: 9.9 MG/DL (ref 8.5–10.1)
CHLORIDE SERPL-SCNC: 101 MMOL/L (ref 98–107)
CO2 SERPL-SCNC: 30 MMOL/L (ref 21–32)
CREAT SERPL-MCNC: 0.8 MG/DL (ref 0.6–1.3)
EOSINOPHIL NFR BLD AUTO: 2.8 % (ref 0–6)
EOSINOPHIL NFR BLD MANUAL: 2 % (ref 0–4)
GLUCOSE SERPL-MCNC: 141 MG/DL (ref 74–106)
HCT VFR BLD AUTO: 35 % (ref 39–51)
HGB BLD-MCNC: 11.6 G/DL (ref 13.5–17.5)
LYMPHOCYTES NFR BLD AUTO: 1.9 K/UL (ref 0.8–4.8)
LYMPHOCYTES NFR BLD AUTO: 19.4 % (ref 20–44)
LYMPHOCYTES NFR BLD MANUAL: 23 % (ref 16–48)
MAGNESIUM SERPL-MCNC: 1.8 MG/DL (ref 1.8–2.4)
MCHC RBC AUTO-ENTMCNC: 33 G/DL (ref 31–36)
MCV RBC AUTO: 88 FL (ref 80–96)
MONOCYTES NFR BLD AUTO: 1.7 K/UL (ref 0.1–1.3)
MONOCYTES NFR BLD AUTO: 17.4 % (ref 2–12)
MONOCYTES NFR BLD MANUAL: 17 % (ref 0–11)
NEUTROPHILS # BLD AUTO: 5.8 K/UL (ref 1.8–8.9)
NEUTROPHILS NFR BLD AUTO: 59.9 % (ref 43–81)
NEUTS BAND NFR BLD MANUAL: 2 % (ref 0–5)
NEUTS SEG NFR BLD MANUAL: 56 % (ref 42–76)
PHOSPHATE SERPL-MCNC: 3.1 MG/DL (ref 2.5–4.9)
PLATELET # BLD AUTO: 213 K/UL (ref 150–450)
POTASSIUM SERPL-SCNC: 4 MMOL/L (ref 3.5–5.1)
RBC # BLD AUTO: 4.02 MIL/UL (ref 4.5–6)
SODIUM SERPL-SCNC: 136 MMOL/L (ref 136–145)
WBC NRBC COR # BLD AUTO: 9.8 K/UL (ref 4.3–11)

## 2022-11-08 RX ADMIN — Medication SCH MG: at 17:32

## 2022-11-08 RX ADMIN — DIVALPROEX SODIUM SCH MG: 125 CAPSULE ORAL at 17:32

## 2022-11-08 RX ADMIN — LORAZEPAM SCH MG: 1 TABLET ORAL at 08:12

## 2022-11-08 RX ADMIN — LORAZEPAM SCH MG: 1 TABLET ORAL at 21:03

## 2022-11-08 RX ADMIN — ACETAMINOPHEN PRN MG: 325 TABLET ORAL at 21:06

## 2022-11-08 RX ADMIN — Medication SCH MG: at 08:12

## 2022-11-08 RX ADMIN — DIVALPROEX SODIUM SCH MG: 125 CAPSULE ORAL at 08:13

## 2022-11-08 RX ADMIN — BENZTROPINE MESYLATE SCH MG: 1 TABLET ORAL at 08:12

## 2022-11-08 RX ADMIN — Medication SCH MG: at 21:03

## 2022-11-08 RX ADMIN — BENZTROPINE MESYLATE SCH MG: 1 TABLET ORAL at 17:34

## 2022-11-08 NOTE — NUR
ms rn note 

 per gael andrews np ok to discharge to snf called to snf spoke with alka andrews report given , 
left a message to wilda family about patient will be discharge 

-------------------------------------------------------------------------------

Addendum: 11/08/22 at 1435 by RAVI NAVARRO RN

-------------------------------------------------------------------------------

 [facility] will be left for further atb administration  per rn from [facility] request

## 2022-11-08 NOTE — NUR
MS RN OPENING NOTE

PT RECEIVED IN BED, AWAKE, A&O X2; CONFUSED. PT ON 2L NC WITH CURRENT O2SAT OF 96%; NO S/S 
OF RESP DISTRESS, NO SOB OR COUGH, NON-LABORED AND EQUAL BREATHING. VSS, WILL CONTINUE TO 
MONITOR AS NEEDED. CONDOM CATH INTACT AND PATENT, DRAINING URINE THAT IS ORANGE-YELLOW IN 
COLOR. IV ACCESS ON RAC 20G, INTACT AND PATENT, FLUSHES EASILY WITH NO RESISTANCE; NO 
MEDS/FLUIDS INFUSING THROUGH IT. BED IN LOWEST POSITION, CALL LIGHT WITHIN REACH, SIDE RAILS 
UP X3. WILL CONTINUE TO MONITOR THROUGHOUT THE NIGHT.

## 2022-11-08 NOTE — NUR
RN NOTE

PT REPORTS OF HAVING HEADACHE AND IS RUBBING HEAD; PT ADMINISTERED TYLENOL 650 MG. WILL 
MONITOR FOR EFFECTIVENESS.

## 2022-11-08 NOTE — NUR
RN OPENING NOTES;



PT IN BED AWAKED, ALERT/ORIENTED X1-2 AND VERBALLY RESPONSIVE OCCASIONALLY  CONFUSED. ON O2 
AT 2L/MIN VIA N/C TOLERATING WELL SATING 98%.NO SIGN SOB/DISTRESS NOTED,NO SIGN FOR 
PAIN/DISCOMFORT AT THIS TIME,DUE MEDS GIVEN AS ORDER,ALL NEEDS ATTENDED,IV ACCESS ON RAC 
G#20.SAFETY MEASURES IN PLACE. SIDE RAILS UP X3, BED LOCKED AND IN LOWEST POSITION. PLACED 
CALL LIGHT WITH IN REACH. BED ALARM ON, WILL ENDORSED TO NEXT SHIFT.

## 2022-11-08 NOTE — NUR
MS RNNOTES;



PT IN BED AWAKED, ALERT VERBALLY RESPONSIVE OCCASIONALLY  CONFUSED. ON O2 AT 2L/MIN VIA N/C 
MO SOB NOTED AT THIS TIME ./DISTRESS NOTED,NO SIGN FOR PAIN/DISCOMFORT AT THIS TIME ,IV 
ACCESS ON RAC G#20.SAFETY MEASURES IN PLACE. SIDE RAILS UP X3, BED LOCKED AND IN LOWEST 
POSITION. PLACED CALL LIGHT WITH IN REACH.WILL CONT TO MONITOR

## 2022-11-08 NOTE — NUR
MS RN NOTE 

 FED PATENT, ABLE TO EAT WELL ,  PATIENT ALERT WITH CONFUSION, ON 2L NS, NO SOB NOTED AT 
THIS TIME, KEEP CLEAN DRY , TURN REPOSITION,  WITH CONDOM CATH  WITH YELLOW COLOR URINE 
NOTED AT THIS TIME,  RT AC HL INTACT AND FLUSHED WELL , ALL NEEDS ATTENDED WILL CONT TO 
MONITOR CLOSELY,

## 2022-11-09 VITALS — SYSTOLIC BLOOD PRESSURE: 107 MMHG | DIASTOLIC BLOOD PRESSURE: 70 MMHG

## 2022-11-09 VITALS — SYSTOLIC BLOOD PRESSURE: 102 MMHG | DIASTOLIC BLOOD PRESSURE: 61 MMHG

## 2022-11-09 VITALS — DIASTOLIC BLOOD PRESSURE: 64 MMHG | SYSTOLIC BLOOD PRESSURE: 102 MMHG

## 2022-11-09 RX ADMIN — Medication SCH MG: at 08:40

## 2022-11-09 RX ADMIN — LORAZEPAM SCH MG: 1 TABLET ORAL at 08:40

## 2022-11-09 RX ADMIN — DIVALPROEX SODIUM SCH MG: 125 CAPSULE ORAL at 08:40

## 2022-11-09 RX ADMIN — BENZTROPINE MESYLATE SCH MG: 1 TABLET ORAL at 08:40

## 2022-11-09 NOTE — NUR
RN NOTE



RECEIVED PATIENT IN BED RESTING ALERT ORIENTED X2 VERBALLY RESPONSIVE,ON 2L OXYGEN VIA NASAL 
CANNULA, O2:98% IV SITE IS ON RIGHT FOREARM INTACT PATENT,INCONTINENT BOWEL/BLADDER .SAFETY 
MEASURE IMPLEMENT,BED IN LOW POSTIION AND LOCKED,HEAD OF THE BED ELEVATED CONTINUE TO 
MONITOR.

## 2022-11-09 NOTE — NUR
RN DISCHARGE NOTE



PATIENT DISCHARGE TO SKILLED NURSING FACILITY [facility],ALERT ORIENTED X2 VERBALLY 
RESPONSIVE ON 2L OXYGEN VIA NASAL CANNULA VITAL SIGN 108/91 HR 87 O2:95% ON 2L OXYGEN,TEMP 
98.8.PATIENT PICKED UP WITH [facility] WITH ACCOMPANIED BY 2 
EMT,PATIENT LEFT HOSPITAL IN STABLE CONDITION BY AMBULANCE,CALLED RESPONSIBLE PARTY LEFT 
MESSAGE.

## 2022-11-09 NOTE — NUR
MS RN CLOSING NOTE

PT REMAINS IN BED, AWAKE, A&O X2; CONFUSED. CONTINUES TO BE ON 2L NC WITH CURRENT O2SAT 
RANGING FROM 96%-100%; NO S/S OF RESP DISTRESS, NO SOB OR COUGH, NON-LABORED AND EQUAL 
BREATHING. VSS THROUGHOUT THE NIGHT WITH NO SIGNIFICANT CHANGES. CONDOM CATH INTACT AND 
PATENT, DRAINING URINE THAT IS ORANGE-YELLOW IN COLOR. IV ACCESS ON RAC 20G, INTACT AND 
PATENT, FLUSHES EASILY WITH NO RESISTANCE; NO MEDS/FLUIDS INFUSING THROUGH IT. ALL DUE MEDS 
ADMINISTERED DURING THE NIGHT. BED IN LOWEST POSITION, CALL LIGHT WITHIN REACH, SIDE RAILS 
UP X3. WILL ENDORSE TO DAYSHIFT NURSE TO CONTINUE CARE.

## 2022-11-09 NOTE — NUR
RN NOTE



PATIENT WILL DISCHARGE TO SN[facility] SKILLED NURSING FACILITY REPORT GIVEN TO HARIS 
RN CONTINUE TO MONITOR.

## 2022-12-16 ENCOUNTER — HOSPITAL ENCOUNTER (INPATIENT)
Dept: HOSPITAL 54 - ER | Age: 72
LOS: 1 days | DRG: 720 | End: 2022-12-17
Attending: INTERNAL MEDICINE | Admitting: INTERNAL MEDICINE
Payer: MEDICARE

## 2022-12-16 VITALS — DIASTOLIC BLOOD PRESSURE: 47 MMHG | SYSTOLIC BLOOD PRESSURE: 94 MMHG

## 2022-12-16 VITALS — SYSTOLIC BLOOD PRESSURE: 77 MMHG | DIASTOLIC BLOOD PRESSURE: 41 MMHG

## 2022-12-16 VITALS — SYSTOLIC BLOOD PRESSURE: 105 MMHG | DIASTOLIC BLOOD PRESSURE: 64 MMHG

## 2022-12-16 VITALS — SYSTOLIC BLOOD PRESSURE: 97 MMHG | DIASTOLIC BLOOD PRESSURE: 61 MMHG

## 2022-12-16 VITALS — DIASTOLIC BLOOD PRESSURE: 37 MMHG | SYSTOLIC BLOOD PRESSURE: 80 MMHG

## 2022-12-16 VITALS — DIASTOLIC BLOOD PRESSURE: 72 MMHG | SYSTOLIC BLOOD PRESSURE: 113 MMHG

## 2022-12-16 VITALS — SYSTOLIC BLOOD PRESSURE: 96 MMHG | DIASTOLIC BLOOD PRESSURE: 39 MMHG

## 2022-12-16 VITALS — DIASTOLIC BLOOD PRESSURE: 44 MMHG | SYSTOLIC BLOOD PRESSURE: 73 MMHG

## 2022-12-16 VITALS — SYSTOLIC BLOOD PRESSURE: 100 MMHG | DIASTOLIC BLOOD PRESSURE: 52 MMHG

## 2022-12-16 VITALS — DIASTOLIC BLOOD PRESSURE: 55 MMHG | SYSTOLIC BLOOD PRESSURE: 110 MMHG

## 2022-12-16 VITALS — SYSTOLIC BLOOD PRESSURE: 71 MMHG | DIASTOLIC BLOOD PRESSURE: 39 MMHG

## 2022-12-16 VITALS — DIASTOLIC BLOOD PRESSURE: 47 MMHG | SYSTOLIC BLOOD PRESSURE: 109 MMHG

## 2022-12-16 VITALS — HEIGHT: 67 IN | BODY MASS INDEX: 24.01 KG/M2 | WEIGHT: 153 LBS

## 2022-12-16 VITALS — SYSTOLIC BLOOD PRESSURE: 72 MMHG | DIASTOLIC BLOOD PRESSURE: 41 MMHG

## 2022-12-16 VITALS — DIASTOLIC BLOOD PRESSURE: 47 MMHG | SYSTOLIC BLOOD PRESSURE: 96 MMHG

## 2022-12-16 DIAGNOSIS — I48.20: ICD-10-CM

## 2022-12-16 DIAGNOSIS — J15.6: ICD-10-CM

## 2022-12-16 DIAGNOSIS — F31.9: ICD-10-CM

## 2022-12-16 DIAGNOSIS — N17.0: ICD-10-CM

## 2022-12-16 DIAGNOSIS — N39.0: ICD-10-CM

## 2022-12-16 DIAGNOSIS — E87.6: ICD-10-CM

## 2022-12-16 DIAGNOSIS — B96.89: ICD-10-CM

## 2022-12-16 DIAGNOSIS — Z91.81: ICD-10-CM

## 2022-12-16 DIAGNOSIS — E87.0: ICD-10-CM

## 2022-12-16 DIAGNOSIS — Z79.899: ICD-10-CM

## 2022-12-16 DIAGNOSIS — L89.154: ICD-10-CM

## 2022-12-16 DIAGNOSIS — D63.8: ICD-10-CM

## 2022-12-16 DIAGNOSIS — A41.9: Primary | ICD-10-CM

## 2022-12-16 DIAGNOSIS — R65.21: ICD-10-CM

## 2022-12-16 DIAGNOSIS — E43: ICD-10-CM

## 2022-12-16 DIAGNOSIS — C34.90: ICD-10-CM

## 2022-12-16 DIAGNOSIS — E88.09: ICD-10-CM

## 2022-12-16 DIAGNOSIS — J96.01: ICD-10-CM

## 2022-12-16 DIAGNOSIS — F02.80: ICD-10-CM

## 2022-12-16 DIAGNOSIS — R29.6: ICD-10-CM

## 2022-12-16 DIAGNOSIS — I10: ICD-10-CM

## 2022-12-16 DIAGNOSIS — G20: ICD-10-CM

## 2022-12-16 DIAGNOSIS — K73.9: ICD-10-CM

## 2022-12-16 DIAGNOSIS — F20.9: ICD-10-CM

## 2022-12-16 DIAGNOSIS — E86.1: ICD-10-CM

## 2022-12-16 DIAGNOSIS — M81.0: ICD-10-CM

## 2022-12-16 DIAGNOSIS — K21.9: ICD-10-CM

## 2022-12-16 LAB
ALBUMIN SERPL BCP-MCNC: 1.3 G/DL (ref 3.4–5)
ALP SERPL-CCNC: 126 U/L (ref 46–116)
ALT SERPL W P-5'-P-CCNC: 14 U/L (ref 12–78)
AST SERPL W P-5'-P-CCNC: 35 U/L (ref 15–37)
BASE EXCESS BLDA CALC-SCNC: -14.8 MMOL/L
BASE EXCESS BLDA CALC-SCNC: -6.5 MMOL/L
BASOPHILS # BLD AUTO: 0.1 K/UL (ref 0–0.2)
BASOPHILS NFR BLD AUTO: 0.4 % (ref 0–2)
BILIRUB DIRECT SERPL-MCNC: 0.7 MG/DL (ref 0–0.2)
BILIRUB SERPL-MCNC: 1 MG/DL (ref 0.2–1)
BILIRUB UR QL STRIP: NEGATIVE
BUN SERPL-MCNC: 32 MG/DL (ref 7–18)
CALCIUM SERPL-MCNC: 10.7 MG/DL (ref 8.5–10.1)
CHLORIDE SERPL-SCNC: 123 MMOL/L (ref 98–107)
CO2 SERPL-SCNC: 28 MMOL/L (ref 21–32)
COLOR UR: (no result)
CREAT SERPL-MCNC: 1.4 MG/DL (ref 0.6–1.3)
DO-HGB MFR BLDA: 590.6 MMHG
DO-HGB MFR BLDA: 641.9 MMHG
EOSINOPHIL NFR BLD AUTO: 1.9 % (ref 0–6)
GLUCOSE SERPL-MCNC: 131 MG/DL (ref 74–106)
GLUCOSE UR STRIP-MCNC: NEGATIVE MG/DL
HCT VFR BLD AUTO: 37 % (ref 39–51)
HGB BLD-MCNC: 11 G/DL (ref 13.5–17.5)
INHALED O2 CONCENTRATION: 100 %
INHALED O2 CONCENTRATION: 100 %
INTRINSIC PEEP RESPIRATORY: 5 CM H2O
LEUKOCYTE ESTERASE UR QL STRIP: (no result)
LYMPHOCYTES NFR BLD AUTO: 19.6 % (ref 20–44)
LYMPHOCYTES NFR BLD AUTO: 2.6 K/UL (ref 0.8–4.8)
MCHC RBC AUTO-ENTMCNC: 30 G/DL (ref 31–36)
MCV RBC AUTO: 93 FL (ref 80–96)
MONOCYTES NFR BLD AUTO: 1.5 K/UL (ref 0.1–1.3)
MONOCYTES NFR BLD AUTO: 11.2 % (ref 2–12)
NEUTROPHILS # BLD AUTO: 8.8 K/UL (ref 1.8–8.9)
NEUTROPHILS NFR BLD AUTO: 66.9 % (ref 43–81)
NITRITE UR QL STRIP: POSITIVE
PCO2 TEMP ADJ BLDA: 28.7 MMHG (ref 35–45)
PCO2 TEMP ADJ BLDA: 50.7 MMHG (ref 35–45)
PEEP SETTING VENT: 500 ML
PH TEMP ADJ BLDA: 7.09 [PH] (ref 7.35–7.45)
PH TEMP ADJ BLDA: 7.39 [PH] (ref 7.35–7.45)
PH UR STRIP: 5.5 [PH] (ref 5–8)
PLATELET # BLD AUTO: 224 K/UL (ref 150–450)
PO2 TEMP ADJ BLDA: 42.4 MMHG (ref 75–100)
PO2 TEMP ADJ BLDA: 71.7 MMHG (ref 75–100)
POTASSIUM SERPL-SCNC: 4.5 MMOL/L (ref 3.5–5.1)
PROT SERPL-MCNC: 8 G/DL (ref 6.4–8.2)
PROT UR QL STRIP: (no result) MG/DL
RBC # BLD AUTO: 4.02 MIL/UL (ref 4.5–6)
RBC #/AREA URNS HPF: (no result) /HPF (ref 0–2)
SAO2 % BLDA: 75 % (ref 92–98.5)
SAO2 % BLDA: 87.1 % (ref 92–98.5)
SET RATE, BG: 28
SODIUM SERPL-SCNC: 156 MMOL/L (ref 136–145)
UROBILINOGEN UR STRIP-MCNC: >=8 EU/DL
VENTILATION MODE VENT: (no result)
VENTILATION MODE VENT: (no result)
WBC #/AREA URNS HPF: (no result) /HPF (ref 0–3)
WBC NRBC COR # BLD AUTO: 13.1 K/UL (ref 4.3–11)

## 2022-12-16 PROCEDURE — 0BH17EZ INSERTION OF ENDOTRACHEAL AIRWAY INTO TRACHEA, VIA NATURAL OR ARTIFICIAL OPENING: ICD-10-PCS

## 2022-12-16 PROCEDURE — C9803 HOPD COVID-19 SPEC COLLECT: HCPCS

## 2022-12-16 PROCEDURE — 05HC33Z INSERTION OF INFUSION DEVICE INTO LEFT BASILIC VEIN, PERCUTANEOUS APPROACH: ICD-10-PCS | Performed by: NURSE PRACTITIONER

## 2022-12-16 PROCEDURE — C9113 INJ PANTOPRAZOLE SODIUM, VIA: HCPCS

## 2022-12-16 PROCEDURE — 5A1935Z RESPIRATORY VENTILATION, LESS THAN 24 CONSECUTIVE HOURS: ICD-10-PCS | Performed by: INTERNAL MEDICINE

## 2022-12-16 PROCEDURE — A6403 STERILE GAUZE>16 <= 48 SQ IN: HCPCS

## 2022-12-16 PROCEDURE — G0378 HOSPITAL OBSERVATION PER HR: HCPCS

## 2022-12-16 RX ADMIN — PIPERACILLIN SODIUM AND TAZOBACTAM SODIUM SCH MLS/HR: .375; 3 INJECTION, POWDER, LYOPHILIZED, FOR SOLUTION INTRAVENOUS at 10:28

## 2022-12-16 RX ADMIN — PIPERACILLIN SODIUM AND TAZOBACTAM SODIUM SCH MLS/HR: .375; 3 INJECTION, POWDER, LYOPHILIZED, FOR SOLUTION INTRAVENOUS at 17:36

## 2022-12-16 RX ADMIN — DEXTROSE MONOHYDRATE SCH MLS/HR: 50 INJECTION, SOLUTION INTRAVENOUS at 13:39

## 2022-12-16 RX ADMIN — SODIUM CHLORIDE PRN MLS/HR: 9 INJECTION, SOLUTION INTRAVENOUS at 22:21

## 2022-12-16 RX ADMIN — ENOXAPARIN SODIUM SCH MG: 40 INJECTION SUBCUTANEOUS at 15:26

## 2022-12-16 RX ADMIN — PANTOPRAZOLE SODIUM SCH MG: 40 TABLET, DELAYED RELEASE ORAL at 08:56

## 2022-12-16 NOTE — NUR
RN NOTE



I CALLED THE DAUGHTER JONE TO GET THE CONSENT FOR SACRAL, SHE DID NOT RESPOND I LEFT HER A 
VOICE MESSAGE TO CALL US BACK.

## 2022-12-16 NOTE — NUR
tele rn notes 

Transfer Pts to ICU room 257 on 15 liters  non rebreather  mask report  given to KELSIE Mondragon 
.

## 2022-12-16 NOTE — NUR
RT NOTE

LATE ENTRY

Pt rec'd on NRB mask at 15LPM. STAT ABG taken and critical Results noted and given to Orlando JOHNSON. Pt placed on BIPAP on noted settings per Orlando JOHNSON orders. Bipap plugged into red outlet. 
Alarms are set and audible. Ambu bag at bedside.

-------------------------------------------------------------------------------

Addendum: 12/16/22 at 2202 by EMIGDIO AGARWAL RT

-------------------------------------------------------------------------------

Amended: Links added.

## 2022-12-16 NOTE — NUR
Tele  Rn notes

Received  pts and  report  from  Vaughn Roberts  Pts on  15 liters  non rebreather  mask ,0PEN 
EYES   tachycardic  HR 140S, SOB desaturation 80s v/s /77 HR 140s  RR40S  TEMP 98 O2 
SAT 80s HOB ELEVATED . 19 :15HRS SPOKE  TO ALEX CONDE ,RELAYED PTS  CONDITION WITH ORDER STAT  
ABG , RT AT  BEDSIDE,  WAITING  FOR  RESULT. AT 1930 , ALEX CONDE  CAME SEEN PTS  RELAYED  ABG  
RESULT  WITH ORDER TO TRANSFER PTS TO ICU  FOR  RESCUE  BIPAP.PTS IS  FULL CODE. PLACE A 
CALL TO JONE  DAUGHTER  PHONE  KEEP RINGING LEFT  MESSAGE .RT PUT PTS TO  BIPAP BUT BIPAP 
DOES NOT  HELP PTS STILL TACHYPNEIC.ALEX CONDE MADE AWARE  , HE SAID  TRANSFER PTS TO ICU  FOR 
INTUBATION .RN SUPERVISOR  MADE AWARE , PTS WILL GO TO ROOM 257 , ICU   CHARGE  NURSE ED  
MADE AWARE , REPORT WILL BE GIVEN TO CHANA AT BEDSIDE.

## 2022-12-16 NOTE — NUR
RECEIVED PT FROM  CRISTIAN IGLESIAS PT OPEN HIS EYE NOT FALLOW COMMAND WITH FIO2 100% 
NRM 

RESTING AT HIS TIME

## 2022-12-16 NOTE — NUR
RN CLOSING NOTE



PATIENT ALERT ORIENTED X1 CAME FROM [facility] TO ER DUR TO RESPIRATORY FAILURE AND 
SEPSIS. PATIENT IS NPO, UNABLE TO SWALLOW. SWALLOW EVAL ORDERED ALREADY. DOCTOR ARMIDA 
INFORMED. I CALLED PHARMACY AND INFORMED THEM THEY SAID ALL HIS MEDICATION WILL BE IV. 
PATIENT HAS LAC G #18 IS ON NS 75 ML/HR RUNNING, INTACT AND FLUSHING. PATIENT CAME WITH 
RAMON CATHETER DRAINING SHYAM COLOR URINE 950 OUTPUT IN MY SHIFT. PATIENT HAS EDEMA +2 ON 
BOTH LEFT, BLISTER ON LEFT ANKLE DUE TO EDEMA. STAGE 3 WOUND ON SACRAL HOLDEN ORDERED [facility] 
[facility] WOUND CARE ORDER. I CALLED DAUGHTER JONE TO GET THE CONSENT FOR WOUND 
DEBRIDEMENT, BUT DID NOT RESPOND AND I LEFT HER A VOICE MESSAGE. AND ENDORSED TO THE NIGHT 
SHIFT NURSE TO FOLLOW UP. BED AT LOWEST POSITION, CALL LIGHT WITHIN REACH. WILL ENDORSE THE 
PATIENT TO THE NIGHT SHIFT NURSE FOR CUCO.

## 2022-12-16 NOTE — NUR
WOUND CARE CONSULT: LIMITED ASSESSMENT DUE TO PT COMBATIVE. PER RN, THERE IS PACKING IN 
SACRAL WOUND BUT PT ADAMANTLY RESISTED TURNING AND SHOUTED NO.  LEFT LOWER LEG NOTED TO HAVE 
4+ PITTING EDEMA WITH RAISED AREA TO ANTERIOR LEFT LOWER LEG AND DISCOLORATION (DEEP TISSUE 
INJURIES TO LATERAL KNEE AND LOWER LEG, PRESENT ON ADMISSION). RECOMMENDATIONS MADE FOR SKIN 
PROTECTION. DISCUSSED WITH NURSING STAFF. PT IS ON Holder ISOFLEX LOW AIRLOSS BED. DR ISABEL NOTIFIED OF SURGICAL CONSULT REQUEST. MD IN AGREEMENT WITH PLAN OF CARE.

## 2022-12-16 NOTE — NUR
[facility] FROM [facility] FOR SOB X 1 HOUR PTA. PATIENT ON NRB AT 15LPM SATURATING AT 
98%. PATIENT CAME WITH IV BC ON RIGHT AC G18, WITH IFC F16 ATTACHED TO 
UROBAG, WITH PRESSURE ULCER AT SACRAL AREA. PATIENT IS AAOX0. POOR HISTORIAN. 
ATTACHED TO MONITOR. VITALS CHECKED.

## 2022-12-16 NOTE — NUR
RN NOTES



PATIENT INTUBATED BY DR DARBY. RT AT BEDSIDE, CN AT BEDSIDE. HOOKED AT MECHANICAL VENTILATOR 
WITH PRESCRIBED SETTINGS. DR ELIZABETH MONTAGUE CHECKED PATIENT WITH ORDERS  NOTED. WILL CLOSELY 
MONITOR THE PATIENT

## 2022-12-16 NOTE — NUR
RT NOTE

LATE ENTRY

Patient on Bipap and unable to maintain adequate O2 saturation. Pt RR > 40 breaths per min. 
Pt transferred to . Pt orally intubated via ETT sz #7.0 Secured at 22CM at the 
lipline. Color change noted via CO2 detector, clear bilateral breath sounds heard on 
auscultation. Waiting on chest x ray results. Pt placed on [facility] vent on AC mode settings per 
MD orders. Vent plugged into red outlet. Alarms are set and audible. Ambu bag at bedside. 
ABG to be taken within 1 hour.

-------------------------------------------------------------------------------

Addendum: 12/16/22 at 2154 by EMIGDIO AGARWAL RT

-------------------------------------------------------------------------------

Amended: Links added.

## 2022-12-16 NOTE — NUR
RN NOTES





RECEIVED PATIENT FROM [facility]. PATIENT ON BIPAP AT PRESCRIBED SETTINGS. PATIENT TACHYCARDIC AND 
LABORED BREATHING SATING 89% RT AT BEDSIDE. CONNECTED TO CARDIAC MONITOR. IV LINE IS NOT 
WORKING. RAMON CATHETER PATENT DRAINING HSYAM COLORED URINE OUTPUT. HOB ELEVATED. WILL 
CLOSELY MONITOR THE PATIENT

## 2022-12-16 NOTE — NUR
RN NOTE



WOUND CONSULT ORDERED, CATALINO AND I TRIED TO CHECK PATIENT'S BACK SACRAL AREA, BUT PATIENT IS 
COMBATIVE. CATALINO THE WOUND CARE NURSE SAID SHE WILL ASK MELYSSA

## 2022-12-17 VITALS — SYSTOLIC BLOOD PRESSURE: 77 MMHG | DIASTOLIC BLOOD PRESSURE: 53 MMHG

## 2022-12-17 VITALS — SYSTOLIC BLOOD PRESSURE: 72 MMHG | DIASTOLIC BLOOD PRESSURE: 39 MMHG

## 2022-12-17 VITALS — SYSTOLIC BLOOD PRESSURE: 100 MMHG | DIASTOLIC BLOOD PRESSURE: 45 MMHG

## 2022-12-17 VITALS — SYSTOLIC BLOOD PRESSURE: 111 MMHG | DIASTOLIC BLOOD PRESSURE: 45 MMHG

## 2022-12-17 VITALS — DIASTOLIC BLOOD PRESSURE: 52 MMHG | SYSTOLIC BLOOD PRESSURE: 111 MMHG

## 2022-12-17 VITALS — DIASTOLIC BLOOD PRESSURE: 40 MMHG | SYSTOLIC BLOOD PRESSURE: 66 MMHG

## 2022-12-17 VITALS — SYSTOLIC BLOOD PRESSURE: 53 MMHG | DIASTOLIC BLOOD PRESSURE: 28 MMHG

## 2022-12-17 VITALS — DIASTOLIC BLOOD PRESSURE: 47 MMHG | SYSTOLIC BLOOD PRESSURE: 79 MMHG

## 2022-12-17 VITALS — SYSTOLIC BLOOD PRESSURE: 82 MMHG | DIASTOLIC BLOOD PRESSURE: 47 MMHG

## 2022-12-17 VITALS — DIASTOLIC BLOOD PRESSURE: 40 MMHG | SYSTOLIC BLOOD PRESSURE: 73 MMHG

## 2022-12-17 VITALS — DIASTOLIC BLOOD PRESSURE: 69 MMHG | SYSTOLIC BLOOD PRESSURE: 79 MMHG

## 2022-12-17 VITALS — DIASTOLIC BLOOD PRESSURE: 55 MMHG | SYSTOLIC BLOOD PRESSURE: 95 MMHG

## 2022-12-17 VITALS — DIASTOLIC BLOOD PRESSURE: 45 MMHG | SYSTOLIC BLOOD PRESSURE: 85 MMHG

## 2022-12-17 VITALS — DIASTOLIC BLOOD PRESSURE: 50 MMHG | SYSTOLIC BLOOD PRESSURE: 81 MMHG

## 2022-12-17 VITALS — DIASTOLIC BLOOD PRESSURE: 50 MMHG | SYSTOLIC BLOOD PRESSURE: 88 MMHG

## 2022-12-17 VITALS — DIASTOLIC BLOOD PRESSURE: 50 MMHG | SYSTOLIC BLOOD PRESSURE: 93 MMHG

## 2022-12-17 VITALS — DIASTOLIC BLOOD PRESSURE: 51 MMHG | SYSTOLIC BLOOD PRESSURE: 80 MMHG

## 2022-12-17 VITALS — DIASTOLIC BLOOD PRESSURE: 52 MMHG | SYSTOLIC BLOOD PRESSURE: 87 MMHG

## 2022-12-17 VITALS — SYSTOLIC BLOOD PRESSURE: 95 MMHG | DIASTOLIC BLOOD PRESSURE: 66 MMHG

## 2022-12-17 VITALS — DIASTOLIC BLOOD PRESSURE: 56 MMHG | SYSTOLIC BLOOD PRESSURE: 76 MMHG

## 2022-12-17 VITALS — DIASTOLIC BLOOD PRESSURE: 37 MMHG | SYSTOLIC BLOOD PRESSURE: 91 MMHG

## 2022-12-17 VITALS — DIASTOLIC BLOOD PRESSURE: 52 MMHG | SYSTOLIC BLOOD PRESSURE: 96 MMHG

## 2022-12-17 VITALS — DIASTOLIC BLOOD PRESSURE: 49 MMHG | SYSTOLIC BLOOD PRESSURE: 92 MMHG

## 2022-12-17 VITALS — DIASTOLIC BLOOD PRESSURE: 52 MMHG | SYSTOLIC BLOOD PRESSURE: 68 MMHG

## 2022-12-17 VITALS — DIASTOLIC BLOOD PRESSURE: 35 MMHG | SYSTOLIC BLOOD PRESSURE: 52 MMHG

## 2022-12-17 VITALS — DIASTOLIC BLOOD PRESSURE: 37 MMHG | SYSTOLIC BLOOD PRESSURE: 68 MMHG

## 2022-12-17 VITALS — SYSTOLIC BLOOD PRESSURE: 81 MMHG | DIASTOLIC BLOOD PRESSURE: 53 MMHG

## 2022-12-17 VITALS — SYSTOLIC BLOOD PRESSURE: 92 MMHG | DIASTOLIC BLOOD PRESSURE: 53 MMHG

## 2022-12-17 VITALS — SYSTOLIC BLOOD PRESSURE: 85 MMHG | DIASTOLIC BLOOD PRESSURE: 57 MMHG

## 2022-12-17 VITALS — DIASTOLIC BLOOD PRESSURE: 46 MMHG | SYSTOLIC BLOOD PRESSURE: 63 MMHG

## 2022-12-17 VITALS — SYSTOLIC BLOOD PRESSURE: 100 MMHG | DIASTOLIC BLOOD PRESSURE: 57 MMHG

## 2022-12-17 VITALS — DIASTOLIC BLOOD PRESSURE: 39 MMHG | SYSTOLIC BLOOD PRESSURE: 105 MMHG

## 2022-12-17 VITALS — DIASTOLIC BLOOD PRESSURE: 44 MMHG | SYSTOLIC BLOOD PRESSURE: 79 MMHG

## 2022-12-17 VITALS — DIASTOLIC BLOOD PRESSURE: 51 MMHG | SYSTOLIC BLOOD PRESSURE: 62 MMHG

## 2022-12-17 VITALS — DIASTOLIC BLOOD PRESSURE: 47 MMHG | SYSTOLIC BLOOD PRESSURE: 101 MMHG

## 2022-12-17 VITALS — DIASTOLIC BLOOD PRESSURE: 48 MMHG | SYSTOLIC BLOOD PRESSURE: 81 MMHG

## 2022-12-17 VITALS — SYSTOLIC BLOOD PRESSURE: 89 MMHG | DIASTOLIC BLOOD PRESSURE: 52 MMHG

## 2022-12-17 VITALS — SYSTOLIC BLOOD PRESSURE: 98 MMHG | DIASTOLIC BLOOD PRESSURE: 45 MMHG

## 2022-12-17 VITALS — DIASTOLIC BLOOD PRESSURE: 51 MMHG | SYSTOLIC BLOOD PRESSURE: 71 MMHG

## 2022-12-17 VITALS — SYSTOLIC BLOOD PRESSURE: 102 MMHG | DIASTOLIC BLOOD PRESSURE: 48 MMHG

## 2022-12-17 VITALS — DIASTOLIC BLOOD PRESSURE: 48 MMHG | SYSTOLIC BLOOD PRESSURE: 94 MMHG

## 2022-12-17 VITALS — SYSTOLIC BLOOD PRESSURE: 101 MMHG | DIASTOLIC BLOOD PRESSURE: 64 MMHG

## 2022-12-17 VITALS — DIASTOLIC BLOOD PRESSURE: 53 MMHG | SYSTOLIC BLOOD PRESSURE: 71 MMHG

## 2022-12-17 VITALS — SYSTOLIC BLOOD PRESSURE: 113 MMHG | DIASTOLIC BLOOD PRESSURE: 45 MMHG

## 2022-12-17 VITALS — SYSTOLIC BLOOD PRESSURE: 116 MMHG | DIASTOLIC BLOOD PRESSURE: 49 MMHG

## 2022-12-17 VITALS — SYSTOLIC BLOOD PRESSURE: 83 MMHG | DIASTOLIC BLOOD PRESSURE: 44 MMHG

## 2022-12-17 VITALS — DIASTOLIC BLOOD PRESSURE: 47 MMHG | SYSTOLIC BLOOD PRESSURE: 80 MMHG

## 2022-12-17 VITALS — DIASTOLIC BLOOD PRESSURE: 46 MMHG | SYSTOLIC BLOOD PRESSURE: 76 MMHG

## 2022-12-17 VITALS — DIASTOLIC BLOOD PRESSURE: 41 MMHG | SYSTOLIC BLOOD PRESSURE: 78 MMHG

## 2022-12-17 VITALS — DIASTOLIC BLOOD PRESSURE: 44 MMHG | SYSTOLIC BLOOD PRESSURE: 80 MMHG

## 2022-12-17 VITALS — DIASTOLIC BLOOD PRESSURE: 45 MMHG | SYSTOLIC BLOOD PRESSURE: 98 MMHG

## 2022-12-17 VITALS — SYSTOLIC BLOOD PRESSURE: 94 MMHG | DIASTOLIC BLOOD PRESSURE: 48 MMHG

## 2022-12-17 LAB
ALBUMIN SERPL BCP-MCNC: 0.7 G/DL (ref 3.4–5)
ALP SERPL-CCNC: 75 U/L (ref 46–116)
ALT SERPL W P-5'-P-CCNC: 6 U/L (ref 12–78)
AST SERPL W P-5'-P-CCNC: 26 U/L (ref 15–37)
BASE EXCESS BLDA CALC-SCNC: -12.6 MMOL/L
BASOPHILS # BLD AUTO: 0 K/UL (ref 0–0.2)
BASOPHILS # BLD AUTO: 0 K/UL (ref 0–0.2)
BASOPHILS NFR BLD AUTO: 0.1 % (ref 0–2)
BASOPHILS NFR BLD AUTO: 0.1 % (ref 0–2)
BILIRUB SERPL-MCNC: 0.8 MG/DL (ref 0.2–1)
BUN SERPL-MCNC: 27 MG/DL (ref 7–18)
BUN SERPL-MCNC: 40 MG/DL (ref 7–18)
CALCIUM SERPL-MCNC: 10.9 MG/DL (ref 8.5–10.1)
CALCIUM SERPL-MCNC: 8.1 MG/DL (ref 8.5–10.1)
CHLORIDE SERPL-SCNC: 122 MMOL/L (ref 98–107)
CHLORIDE SERPL-SCNC: 132 MMOL/L (ref 98–107)
CHOLEST SERPL-MCNC: 62 MG/DL (ref ?–200)
CO2 SERPL-SCNC: 22 MMOL/L (ref 21–32)
CO2 SERPL-SCNC: 33 MMOL/L (ref 21–32)
CREAT SERPL-MCNC: 1.5 MG/DL (ref 0.6–1.3)
CREAT SERPL-MCNC: 2.3 MG/DL (ref 0.6–1.3)
DO-HGB MFR BLDA: 625 MMHG
EOSINOPHIL NFR BLD AUTO: 0.1 % (ref 0–6)
EOSINOPHIL NFR BLD AUTO: 0.3 % (ref 0–6)
EOSINOPHIL NFR BLD MANUAL: 1 % (ref 0–4)
GLUCOSE SERPL-MCNC: 27 MG/DL (ref 74–106)
GLUCOSE SERPL-MCNC: 83 MG/DL (ref 74–106)
HCT VFR BLD AUTO: 39 % (ref 39–51)
HCT VFR BLD AUTO: 41 % (ref 39–51)
HDLC SERPL-MCNC: 11 MG/DL (ref 40–60)
HGB BLD-MCNC: 11.7 G/DL (ref 13.5–17.5)
HGB BLD-MCNC: 11.7 G/DL (ref 13.5–17.5)
INHALED O2 CONCENTRATION: 100 %
INTRINSIC PEEP RESPIRATORY: 5 CM H2O
LDLC SERPL DIRECT ASSAY-MCNC: 31 MG/DL (ref 0–99)
LYMPHOCYTES NFR BLD AUTO: 0.7 K/UL (ref 0.8–4.8)
LYMPHOCYTES NFR BLD AUTO: 1.2 K/UL (ref 0.8–4.8)
LYMPHOCYTES NFR BLD AUTO: 5.4 % (ref 20–44)
LYMPHOCYTES NFR BLD AUTO: 5.4 % (ref 20–44)
LYMPHOCYTES NFR BLD MANUAL: 7 % (ref 16–48)
MAGNESIUM SERPL-MCNC: 2.3 MG/DL (ref 1.8–2.4)
MCHC RBC AUTO-ENTMCNC: 28 G/DL (ref 31–36)
MCHC RBC AUTO-ENTMCNC: 30 G/DL (ref 31–36)
MCV RBC AUTO: 94 FL (ref 80–96)
MCV RBC AUTO: 97 FL (ref 80–96)
METAMYELOCYTES NFR BLD MANUAL: 5 % (ref 0–0)
MONOCYTES NFR BLD AUTO: 0.6 K/UL (ref 0.1–1.3)
MONOCYTES NFR BLD AUTO: 0.7 K/UL (ref 0.1–1.3)
MONOCYTES NFR BLD AUTO: 3.1 % (ref 2–12)
MONOCYTES NFR BLD AUTO: 4.9 % (ref 2–12)
MONOCYTES NFR BLD MANUAL: 3 % (ref 0–11)
MYELOCYTES NFR BLD MANUAL: 3 % (ref 0–0)
NEUTROPHILS # BLD AUTO: 11.7 K/UL (ref 1.8–8.9)
NEUTROPHILS # BLD AUTO: 20 K/UL (ref 1.8–8.9)
NEUTROPHILS NFR BLD AUTO: 89.5 % (ref 43–81)
NEUTROPHILS NFR BLD AUTO: 91.1 % (ref 43–81)
NEUTS BAND NFR BLD MANUAL: 48 % (ref 0–5)
NEUTS SEG NFR BLD MANUAL: 32 % (ref 42–76)
PCO2 TEMP ADJ BLDA: 35.5 MMHG (ref 35–45)
PEEP SETTING VENT: 550 ML
PH TEMP ADJ BLDA: 7.22 [PH] (ref 7.35–7.45)
PHOSPHATE SERPL-MCNC: 6.6 MG/DL (ref 2.5–4.9)
PLATELET # BLD AUTO: 283 K/UL (ref 150–450)
PLATELET # BLD AUTO: 292 K/UL (ref 150–450)
PO2 TEMP ADJ BLDA: 52.5 MMHG (ref 75–100)
POTASSIUM SERPL-SCNC: 2.9 MMOL/L (ref 3.5–5.1)
POTASSIUM SERPL-SCNC: 3.8 MMOL/L (ref 3.5–5.1)
PROT SERPL-MCNC: 5 G/DL (ref 6.4–8.2)
RBC # BLD AUTO: 4.19 MIL/UL (ref 4.5–6)
RBC # BLD AUTO: 4.29 MIL/UL (ref 4.5–6)
SAO2 % BLDA: 82.5 % (ref 92–98.5)
SET RATE, BG: 32
SODIUM SERPL-SCNC: 163 MMOL/L (ref 136–145)
SODIUM SERPL-SCNC: 172 MMOL/L (ref 136–145)
TRIGL SERPL-MCNC: 136 MG/DL (ref 30–150)
TSH SERPL DL<=0.005 MIU/L-ACNC: 6.35 UIU/ML (ref 0.36–3.74)
VARIANT LYMPHS NFR BLD MANUAL: 1 % (ref 0–0)
VENTILATION MODE VENT: (no result)
WBC NRBC COR # BLD AUTO: 13.1 K/UL (ref 4.3–11)
WBC NRBC COR # BLD AUTO: 22 K/UL (ref 4.3–11)

## 2022-12-17 PROCEDURE — 5A12012 PERFORMANCE OF CARDIAC OUTPUT, SINGLE, MANUAL: ICD-10-PCS

## 2022-12-17 RX ADMIN — SODIUM CHLORIDE PRN MLS/HR: 9 INJECTION, SOLUTION INTRAVENOUS at 06:30

## 2022-12-17 RX ADMIN — ENOXAPARIN SODIUM SCH MG: 40 INJECTION SUBCUTANEOUS at 10:05

## 2022-12-17 RX ADMIN — POTASSIUM CHLORIDE SCH MLS/HR: 200 INJECTION, SOLUTION INTRAVENOUS at 05:50

## 2022-12-17 RX ADMIN — PIPERACILLIN SODIUM AND TAZOBACTAM SODIUM SCH MLS/HR: .375; 3 INJECTION, POWDER, LYOPHILIZED, FOR SOLUTION INTRAVENOUS at 02:12

## 2022-12-17 RX ADMIN — POTASSIUM CHLORIDE SCH MLS/HR: 200 INJECTION, SOLUTION INTRAVENOUS at 03:52

## 2022-12-17 RX ADMIN — POTASSIUM CHLORIDE SCH MLS/HR: 200 INJECTION, SOLUTION INTRAVENOUS at 03:05

## 2022-12-17 RX ADMIN — POTASSIUM CHLORIDE SCH MLS/HR: 200 INJECTION, SOLUTION INTRAVENOUS at 04:37

## 2022-12-17 RX ADMIN — DEXTROSE MONOHYDRATE SCH MLS/HR: 50 INJECTION, SOLUTION INTRAVENOUS at 01:28

## 2022-12-17 RX ADMIN — PIPERACILLIN SODIUM AND TAZOBACTAM SODIUM SCH MLS/HR: .375; 3 INJECTION, POWDER, LYOPHILIZED, FOR SOLUTION INTRAVENOUS at 10:06

## 2022-12-17 NOTE — NUR
OPENING NOTE:  REPORT RECEIVED FROM CHANA IGLESIAS.  LABS AND ORDERS REVIEWED DURING REPORT.  PER 
REPORT PATIENT WAS INTUBATED LAST NIGHT AND BROUGHT TO ICU.  PT IS CURRENTLY A MAX DOSE RATE 
ON LEVOPHED, PHENELEPHRINE AND VASOPRESSIN GTT.  PT IS UNRESPONSIVE.  PUPILS ARE FIXED AND 
DILATED, NO COUGH, GAG OR REACTION TO PAIN NOTED.  PT'S BREATHING IS LABORED.  PT CHECKED ON 
FREQUENTLY BY NURSING STAFF.

## 2022-12-17 NOTE — NUR
RN NOTES





CRITICAL LAB RESULT , CHLORIDE 132, GLUCOSE 27. DR MONTAGUE INFORMED WITH ORDER TO GIVE 
DEXTROSE THEN REPEAT BS IN 30 MINUTES, CHANGE IVF FLUIDS TO D5W WITH 100 MEQ SODIUM BICARB

## 2022-12-17 NOTE — NUR
PT WAS ASYSTOLE AT 1216, CODE BLUE CALLED.  SEE CODE BLUE DOCUMENTATION.  PT WAS PRONOUNCED 
AT 1225 BY DR ROCKWELL DURING CODE BLUE.  PRIOR TO CODE BLUE AND DURING CODE BLUE ATTEMPTS TO 
GET A HOLD OF PATIENTS DAUGHTER JONE [phone] WERE FUTILE, MESSAGES WERE LEFT ON 
VOICEMAIL.

## 2022-12-17 NOTE — NUR
RN NOTES





PATIENT ON VENT WITH PRESCRIBED SETTINGS. IV ACCESS PATENT RUNNING KATERIN@ 3MCG/KG/MIN, LEVO @ 
1MCG/KG/MIN, VASO 0.03 UNITS/MIN. RAMON PATENT FLUSHES WELL. OGT IN PLACE. ALL SAFETY 
MEASURES IN PLACE. HOB ELEVATED. WILL ENDORSED TO MORNING SHIFT FOR [facility]CO